# Patient Record
Sex: FEMALE | NOT HISPANIC OR LATINO | Employment: OTHER | URBAN - METROPOLITAN AREA
[De-identification: names, ages, dates, MRNs, and addresses within clinical notes are randomized per-mention and may not be internally consistent; named-entity substitution may affect disease eponyms.]

---

## 2017-12-21 ENCOUNTER — HOSPITAL ENCOUNTER (EMERGENCY)
Facility: CLINIC | Age: 37
Discharge: HOME | End: 2017-12-22
Attending: EMERGENCY MEDICINE | Admitting: EMERGENCY MEDICINE
Payer: COMMERCIAL

## 2017-12-21 ENCOUNTER — APPOINTMENT (OUTPATIENT)
Dept: CT IMAGING | Facility: CLINIC | Age: 37
End: 2017-12-21
Attending: EMERGENCY MEDICINE
Payer: COMMERCIAL

## 2017-12-21 DIAGNOSIS — R11.2 NAUSEA AND VOMITING, INTRACTABILITY OF VOMITING NOT SPECIFIED, UNSPECIFIED VOMITING TYPE: ICD-10-CM

## 2017-12-21 LAB
ALBUMIN SERPL-MCNC: 4.5 G/DL (ref 3.4–5)
ALP SERPL-CCNC: 77 U/L (ref 40–150)
ALT SERPL W P-5'-P-CCNC: 22 U/L (ref 0–50)
ANION GAP SERPL CALCULATED.3IONS-SCNC: 16 MMOL/L (ref 3–14)
AST SERPL W P-5'-P-CCNC: 19 U/L (ref 0–45)
BASOPHILS # BLD AUTO: 0 10E9/L (ref 0–0.2)
BASOPHILS NFR BLD AUTO: 0.2 %
BILIRUB SERPL-MCNC: 0.7 MG/DL (ref 0.2–1.3)
BUN SERPL-MCNC: 9 MG/DL (ref 7–30)
CALCIUM SERPL-MCNC: 9.8 MG/DL (ref 8.5–10.1)
CHLORIDE SERPL-SCNC: 105 MMOL/L (ref 94–109)
CO2 SERPL-SCNC: 17 MMOL/L (ref 20–32)
CREAT SERPL-MCNC: 0.59 MG/DL (ref 0.52–1.04)
DIFFERENTIAL METHOD BLD: NORMAL
EOSINOPHIL # BLD AUTO: 0.5 10E9/L (ref 0–0.7)
EOSINOPHIL NFR BLD AUTO: 5.3 %
ERYTHROCYTE [DISTWIDTH] IN BLOOD BY AUTOMATED COUNT: 13.2 % (ref 10–15)
GFR SERPL CREATININE-BSD FRML MDRD: >90 ML/MIN/1.7M2
GLUCOSE SERPL-MCNC: 117 MG/DL (ref 70–99)
HCT VFR BLD AUTO: 41.3 % (ref 35–47)
HGB BLD-MCNC: 14.5 G/DL (ref 11.7–15.7)
IMM GRANULOCYTES # BLD: 0 10E9/L (ref 0–0.4)
IMM GRANULOCYTES NFR BLD: 0.2 %
LYMPHOCYTES # BLD AUTO: 2.8 10E9/L (ref 0.8–5.3)
LYMPHOCYTES NFR BLD AUTO: 27.8 %
MCH RBC QN AUTO: 31.1 PG (ref 26.5–33)
MCHC RBC AUTO-ENTMCNC: 35.1 G/DL (ref 31.5–36.5)
MCV RBC AUTO: 89 FL (ref 78–100)
MONOCYTES # BLD AUTO: 0.4 10E9/L (ref 0–1.3)
MONOCYTES NFR BLD AUTO: 4 %
NEUTROPHILS # BLD AUTO: 6.3 10E9/L (ref 1.6–8.3)
NEUTROPHILS NFR BLD AUTO: 62.5 %
NRBC # BLD AUTO: 0 10*3/UL
NRBC BLD AUTO-RTO: 0 /100
PLATELET # BLD AUTO: 313 10E9/L (ref 150–450)
POTASSIUM SERPL-SCNC: 3.7 MMOL/L (ref 3.4–5.3)
PROT SERPL-MCNC: 8.3 G/DL (ref 6.8–8.8)
RBC # BLD AUTO: 4.66 10E12/L (ref 3.8–5.2)
SODIUM SERPL-SCNC: 138 MMOL/L (ref 133–144)
WBC # BLD AUTO: 10.1 10E9/L (ref 4–11)

## 2017-12-21 PROCEDURE — 25000128 H RX IP 250 OP 636: Performed by: EMERGENCY MEDICINE

## 2017-12-21 PROCEDURE — 85025 COMPLETE CBC W/AUTO DIFF WBC: CPT | Performed by: EMERGENCY MEDICINE

## 2017-12-21 PROCEDURE — 80053 COMPREHEN METABOLIC PANEL: CPT | Performed by: EMERGENCY MEDICINE

## 2017-12-21 PROCEDURE — 25000125 ZZHC RX 250: Performed by: EMERGENCY MEDICINE

## 2017-12-21 PROCEDURE — 96374 THER/PROPH/DIAG INJ IV PUSH: CPT

## 2017-12-21 PROCEDURE — 96375 TX/PRO/DX INJ NEW DRUG ADDON: CPT

## 2017-12-21 PROCEDURE — 99285 EMERGENCY DEPT VISIT HI MDM: CPT | Mod: 25

## 2017-12-21 PROCEDURE — 74177 CT ABD & PELVIS W/CONTRAST: CPT

## 2017-12-21 RX ORDER — IOPAMIDOL 755 MG/ML
76 INJECTION, SOLUTION INTRAVASCULAR ONCE
Status: COMPLETED | OUTPATIENT
Start: 2017-12-21 | End: 2017-12-21

## 2017-12-21 RX ORDER — LORAZEPAM 2 MG/ML
1 INJECTION INTRAMUSCULAR ONCE
Status: COMPLETED | OUTPATIENT
Start: 2017-12-21 | End: 2017-12-21

## 2017-12-21 RX ORDER — PROCHLORPERAZINE MALEATE 10 MG
10 TABLET ORAL EVERY 8 HOURS PRN
Qty: 20 TABLET | Refills: 0 | Status: SHIPPED | OUTPATIENT
Start: 2017-12-21 | End: 2023-03-14

## 2017-12-21 RX ORDER — MORPHINE SULFATE 4 MG/ML
4 INJECTION, SOLUTION INTRAMUSCULAR; INTRAVENOUS ONCE
Status: COMPLETED | OUTPATIENT
Start: 2017-12-21 | End: 2017-12-21

## 2017-12-21 RX ORDER — ONDANSETRON 4 MG/1
4 TABLET, ORALLY DISINTEGRATING ORAL EVERY 6 HOURS PRN
Qty: 20 TABLET | Refills: 0 | Status: SHIPPED | OUTPATIENT
Start: 2017-12-21 | End: 2023-03-30

## 2017-12-21 RX ORDER — PANTOPRAZOLE SODIUM 40 MG/1
40 TABLET, DELAYED RELEASE ORAL DAILY
Qty: 30 TABLET | Refills: 0 | Status: SHIPPED | OUTPATIENT
Start: 2017-12-21 | End: 2018-01-20

## 2017-12-21 RX ORDER — ONDANSETRON 2 MG/ML
4 INJECTION INTRAMUSCULAR; INTRAVENOUS ONCE
Status: COMPLETED | OUTPATIENT
Start: 2017-12-21 | End: 2017-12-21

## 2017-12-21 RX ADMIN — SODIUM CHLORIDE 63 ML: 9 INJECTION, SOLUTION INTRAVENOUS at 22:33

## 2017-12-21 RX ADMIN — LORAZEPAM 1 MG: 2 INJECTION INTRAMUSCULAR; INTRAVENOUS at 20:53

## 2017-12-21 RX ADMIN — MORPHINE SULFATE 4 MG: 4 INJECTION, SOLUTION INTRAMUSCULAR; INTRAVENOUS at 21:25

## 2017-12-21 RX ADMIN — IOPAMIDOL 76 ML: 755 INJECTION, SOLUTION INTRAVENOUS at 22:33

## 2017-12-21 RX ADMIN — PROCHLORPERAZINE EDISYLATE 10 MG: 5 INJECTION INTRAMUSCULAR; INTRAVENOUS at 23:12

## 2017-12-21 RX ADMIN — ONDANSETRON 4 MG: 2 INJECTION INTRAMUSCULAR; INTRAVENOUS at 20:53

## 2017-12-21 ASSESSMENT — ENCOUNTER SYMPTOMS
LIGHT-HEADEDNESS: 1
VOMITING: 1
ABDOMINAL PAIN: 1
DIARRHEA: 0

## 2017-12-21 NOTE — ED AVS SNAPSHOT
Emergency Department    [address] [place] [address]    Phone:  [phone]    Fax:  [phone]                                       Kayley Scruggs   MRN: [id number]    Department:   Emergency Department   Date of Visit:  12/21/2017           After Visit Summary Signature Page     I have received my discharge instructions, and my questions have been answered. I have discussed any challenges I see with this plan with the nurse or doctor.    ..........................................................................................................................................  Patient/Patient Representative Signature      ..........................................................................................................................................  Patient Representative Print Name and Relationship to Patient    ..................................................               ................................................  Date                                            Time    ..........................................................................................................................................  Reviewed by Signature/Title    ...................................................              ..............................................  Date                                                            Time

## 2017-12-21 NOTE — ED AVS SNAPSHOT
Emergency Department    [address] [facility] [address]    Phone:  [phone]    Fax:  [phone]                                       Kayley Scruggs   MRN: [id number]    Department:   Emergency Department   Date of Visit:  12/21/2017           Patient Information     Date Of Birth          1980        Your diagnoses for this visit were:     Nausea and vomiting, intractability of vomiting not specified, unspecified vomiting type        You were seen by Aaron Sands MD.      Follow-up Information     Follow up with Clinic, [facility].    Why:  As needed, If symptoms worsen    Contact information:    [address], [address]  [facility] [address] [phone]          Follow up with  Emergency Department.    Specialty:  EMERGENCY MEDICINE    Why:  As needed, If symptoms worsen    Contact information:    [address] [facility] [id number] [phone]        Discharge Instructions       Discharge Instructions  Vomiting    You have been seen today for vomiting (throwing up). This is usually caused by a virus, but some bacteria, parasites, medicines or other medical conditions can cause similar symptoms. At this time your provider does not find that your vomiting is a sign of anything dangerous or life-threatening. However, sometimes the signs of serious illness do not show up right away. If you have new or worse symptoms, you may need to be seen again in the Emergency Department or by your primary provider. Remember that serious problems like appendicitis can start as vomiting.    Generally, every Emergency Department visit should have a follow-up clinic visit with either a primary or a specialty clinic/provider. Please follow-up as instructed by your emergency provider today.    Return to the Emergency Department if:    You keep vomiting and you are not able to keep liquids down.     You feel you are getting dehydrated, such as  being very thirsty, not urinating (peeing) at least every 8-12 hours, or feeling faint or lightheaded.     You develop a new fever, or your fever continues for more than 2 days.     You have abdominal (belly pain) that seems worse than cramps, is in one spot, or is getting worse over time. Appendicitis usually causes pain in the right lower abdomen (to the right and below your belly button) so watch for pain in this location.    You have blood in your vomit or stools.     You feel very weak.    You are not starting to improve within 24 hours of your visit here.     What can I do to help myself?    The most important thing to do is to drink clear liquids. If you have been vomiting a lot, it is best to have only small, frequent sips of liquids. Drinking too much at once may cause more vomiting. If you are vomiting often, you must replace minerals, sodium and potassium lost with your illness. Pedialyte  is the best available rehydration liquid but some find that it doesn t taste good so sports drinks are an alterative. You can also drink clear liquids such as water, weak tea, apple juice, and 7-Up . Avoid acid liquids (orange), caffeine (coffee) or alcohol. Do not drink milk until you no longer have diarrhea (loose stools).     After liquids are staying down, you may start eating mild foods. Soda crackers, toast, plain noodles, gelatin, applesauce and bananas are good first choices. Avoid foods that have acid, are spicy, fatty or have a lot of fiber (such as meats, coarse grains, vegetables). You may start eating these foods again in about 3 days when you are better.     Sometimes treatment includes prescription medicine to prevent nausea (sick to your stomach) and vomiting. If your provider prescribes these for you, take them as directed.     Do not take ibuprofen, naproxen, or other nonsteroidal anti-inflammatory (NSAID) medicines without checking with your healthcare provider.     If you were given a prescription  for medicine here today, be sure to read all of the information (including the package insert) that comes with your prescription.  This will include important information about the medicine, its side effects, and any warnings that you need to know about.  The pharmacist who fills the prescription can provide more information and answer questions you may have about the medicine.  If you have questions or concerns that the pharmacist cannot address, please call or return to the Emergency Department.     Remember that you can always come back to the Emergency Department if you are not able to see your regular provider in the amount of time listed above, if you get any new symptoms, or if there is anything that worries you.      Discharge Instructions  Abdominal Pain    Abdominal pain (belly pain) can be caused by many things. Your evaluation today does not show the exact cause for your pain. Your provider today has decided that it is unlikely your pain is due to a life threatening problem, or a problem requiring surgery or hospital admission. Sometimes those problems cannot be found right away, so it is very important that you follow up as directed.  Sometimes only the changes which occur over time allow the cause of your pain to be found.    Generally, every Emergency Department visit should have a follow-up clinic visit with either a primary or a specialty clinic/provider. Please follow-up as instructed by your emergency provider today. With abdominal pain, we often recommend very close follow-up, such as the following day.    ADULTS:  Return to the Emergency Department right away if:      You get an oral temperature above 102oF or as directed by your provider.    You have blood in your stools. This may be bright red or appear as black, tarry stools.      You keep vomiting (throwing up) or cannot drink liquids.    You see blood when you vomit.     You cannot have a bowel movement or you cannot pass gas.    Your  stomach gets bloated or bigger.    Your skin or the whites of your eyes look yellow.    You faint.    You have bloody, frequent or painful urination (peeing).    You have new symptoms or anything that worries you.    CHILDREN:  Return to the Emergency Department right away if your child has any of the above-listed symptoms or the following:      Pushes your hand away or screams/cries when his/her belly is touched.    You notice your child is very fussy or weak.    Your child is very tired and is too tired to eat or drink.    Your child is dehydrated.  Signs of dehydration can be:  o Significant change in the amount of wet diapers/urine.  o Your infant or child starts to have dry mouth and lips, or no saliva (spit) or tears.    PREGNANT WOMEN:  Return to the Emergency Department right away if you have any of the above-listed symptoms or the following:      You have bleeding, leaking fluid or passing tissue from the vagina.    You have worse pain or cramping, or pain in your shoulder or back.    You have vomiting that will not stop.    You have a temperature of 100oF or more.    Your baby is not moving as much as usual.    You faint.    You get a bad headache with or without eye problems and abdominal pain.    You have a seizure.    You have unusual discharge from your vagina and abdominal pain.    Abdominal pain is pretty common during pregnancy.  Your pain may or may not be related to your pregnancy. You should follow-up closely with your OB provider so they can evaluate you and your baby.  Until you follow-up with your regular provider, do the following:       Avoid sex and do not put anything in your vagina.    Drink clear fluids.    Only take medications approved by your provider.    MORE INFORMATION:    Appendicitis:  A possible cause of abdominal pain in any person who still has their appendix is acute appendicitis. Appendicitis is often hard to diagnose.  Testing does not always rule out early appendicitis or  "other causes of abdominal pain. Close follow-up with your provider and re-evaluations may be needed to figure out the reason for your abdominal pain.    Follow-up:  It is very important that you make an appointment with your clinic and go to the appointment.  If you do not follow-up with your primary provider, it may result in missing an important development which could result in permanent injury or disability and/or lasting pain.  If there is any problem keeping your appointment, call your provider or return to the Emergency Department.    Medications:  Take your medications as directed by your provider today.  Before using over-the-counter medications, ask your provider and make sure to take the medications as directed.  If you have any questions about medications, ask your provider.    Diet:  Resume your normal diet as much as possible, but do not eat fried, fatty or spicy foods while you have pain.  Do not drink alcohol or have caffeine.  Do not smoke tobacco.    Probiotics: If you have been given an antibiotic, you may want to also take a probiotic pill or eat yogurt with live cultures. Probiotics have \"good bacteria\" to help your intestines stay healthy. Studies have shown that probiotics help prevent diarrhea (loose stools) and other intestine problems (including C. diff infection) when you take antibiotics. You can buy these without a prescription in the pharmacy section of the store.     If you were given a prescription for medicine here today, be sure to read all of the information (including the package insert) that comes with your prescription.  This will include important information about the medicine, its side effects, and any warnings that you need to know about.  The pharmacist who fills the prescription can provide more information and answer questions you may have about the medicine.  If you have questions or concerns that the pharmacist cannot address, please call or return to the Emergency " Department.       Remember that you can always come back to the Emergency Department if you are not able to see your regular provider in the amount of time listed above, if you get any new symptoms, or if there is anything that worries you.      24 Hour Appointment Hotline       To make an appointment at any [facility], call [phone] ([phone]). If you don't have a family doctor or clinic, we will help you find one. [facility] clinics are conveniently located to serve the needs of you and your family.             Review of your medicines      START taking        Dose / Directions Last dose taken    ondansetron 4 MG ODT tab   Commonly known as:  ZOFRAN ODT   Dose:  4 mg   Quantity:  20 tablet        Take 1 tablet (4 mg) by mouth every 6 hours as needed for nausea or vomiting   Refills:  0        pantoprazole 40 MG EC tablet   Commonly known as:  PROTONIX   Dose:  40 mg   Quantity:  30 tablet        Take 1 tablet (40 mg) by mouth daily for 30 doses   Refills:  0        prochlorperazine 10 MG tablet   Commonly known as:  COMPAZINE   Dose:  10 mg   Quantity:  20 tablet        Take 1 tablet (10 mg) by mouth every 8 hours as needed for nausea   Refills:  0        ranitidine 150 MG tablet   Commonly known as:  ZANTAC   Dose:  150 mg   Quantity:  20 tablet        Take 1 tablet (150 mg) by mouth 2 times daily for 10 days   Refills:  0          Our records show that you are taking the medicines listed below. If these are incorrect, please call your family doctor or clinic.        Dose / Directions Last dose taken    loratadine 10 MG tablet   Commonly known as:  CLARITIN   Dose:  10 mg        Take 10 mg by mouth daily   Refills:  0        MICROGESTIN 1.5-30 MG-MCG per tablet   Dose:  1 tablet   Generic drug:  norethindrone-ethinyl estradiol        Take 1 tablet by mouth daily Unsure dose   Refills:  0        MIDRIN PO        Refills:  0                Prescriptions were sent or printed at these locations (4  Prescriptions)                   Other Prescriptions                Printed at Department/Unit printer (4 of 4)         prochlorperazine (COMPAZINE) 10 MG tablet               ondansetron (ZOFRAN ODT) 4 MG ODT tab               pantoprazole (PROTONIX) 40 MG EC tablet               ranitidine (ZANTAC) 150 MG tablet                Procedures and tests performed during your visit     CBC with platelets differential    CT Abdomen Pelvis w Contrast    Comprehensive metabolic panel      Orders Needing Specimen Collection     None      Pending Results     No orders found for last 3 day(s).            Pending Culture Results     No orders found for last 3 day(s).            Pending Results Instructions     If you had any lab results that were not finalized at the time of your Discharge, you can call the ED Lab Result RN at [phone]. You will be contacted by this team for any positive Lab results or changes in treatment. The nurses are available 7 days a week from 10A to 6:30P.  You can leave a message 24 hours per day and they will return your call.        Test Results From Your Hospital Stay        12/21/2017  8:55 PM      Component Results     Component Value Ref Range & Units Status    WBC 10.1 4.0 - 11.0 10e9/L Final    RBC Count 4.66 3.8 - 5.2 10e12/L Final    Hemoglobin 14.5 11.7 - 15.7 g/dL Final    Hematocrit 41.3 35.0 - 47.0 % Final    MCV 89 78 - 100 fl Final    MCH 31.1 26.5 - 33.0 pg Final    MCHC 35.1 31.5 - 36.5 g/dL Final    RDW 13.2 10.0 - 15.0 % Final    Platelet Count 313 150 - 450 10e9/L Final    Diff Method Automated Method  Final    % Neutrophils 62.5 % Final    % Lymphocytes 27.8 % Final    % Monocytes 4.0 % Final    % Eosinophils 5.3 % Final    % Basophils 0.2 % Final    % Immature Granulocytes 0.2 % Final    Nucleated RBCs 0 0 /100 Final    Absolute Neutrophil 6.3 1.6 - 8.3 10e9/L Final    Absolute Lymphocytes 2.8 0.8 - 5.3 10e9/L Final    Absolute Monocytes 0.4 0.0 - 1.3 10e9/L Final    Absolute  Eosinophils 0.5 0.0 - 0.7 10e9/L Final    Absolute Basophils 0.0 0.0 - 0.2 10e9/L Final    Abs Immature Granulocytes 0.0 0 - 0.4 10e9/L Final    Absolute Nucleated RBC 0.0  Final         12/21/2017  9:12 PM      Component Results     Component Value Ref Range & Units Status    Sodium 138 133 - 144 mmol/L Final    Potassium 3.7 3.4 - 5.3 mmol/L Final    Chloride 105 94 - 109 mmol/L Final    Carbon Dioxide 17 (L) 20 - 32 mmol/L Final    Anion Gap 16 (H) 3 - 14 mmol/L Final    Glucose 117 (H) 70 - 99 mg/dL Final    Urea Nitrogen 9 7 - 30 mg/dL Final    Creatinine 0.59 0.52 - 1.04 mg/dL Final    GFR Estimate >90 >60 mL/min/1.7m2 Final    Non  GFR Calc    GFR Estimate If Black >90 >60 mL/min/1.7m2 Final    African American GFR Calc    Calcium 9.8 8.5 - 10.1 mg/dL Final    Bilirubin Total 0.7 0.2 - 1.3 mg/dL Final    Albumin 4.5 3.4 - 5.0 g/dL Final    Protein Total 8.3 6.8 - 8.8 g/dL Final    Alkaline Phosphatase 77 40 - 150 U/L Final    ALT 22 0 - 50 U/L Final    AST 19 0 - 45 U/L Final         12/21/2017 11:09 PM      Narrative     CT ABDOMEN AND PELVIS WITH CONTRAST  12/21/2017  10:40 PM     HISTORY: Abdominal pain, vomiting. History of Nissen. History of Hyst.  Obstruction.     CONTRAST DOSE: Radiation dose for this scan was reduced using  automated exposure control, adjustment of the mA and/or kV according  to patient size, or iterative reconstruction technique.    FINDINGS:  Sutures are noted in the cardia of the stomach. The liver,  spleen, kidneys, adrenal glands, pancreas, and gallbladder appear  within normal limits. The appendix is not definitively identified. No  pericecal inflammatory changes are noted. Fluid is noted within  scattered loops of small and large bowel. However, there is no  evidence of bowel obstruction or pericolonic inflammatory stranding.  There is no free peritoneal fluid or air. Pelvic contents appear  within normal limits.        Impression     IMPRESSION: No CT evidence  of an acute inflammatory process in the  abdomen or pelvis. No definitive evidence of bowel obstruction. No  free peritoneal fluid or air.    KOBE BURCH MD                Clinical Quality Measure: Blood Pressure Screening     Your blood pressure was checked while you were in the emergency department today. The last reading we obtained was  BP: 121/70 . Please read the guidelines below about what these numbers mean and what you should do about them.  If your systolic blood pressure (the top number) is less than 120 and your diastolic blood pressure (the bottom number) is less than 80, then your blood pressure is normal. There is nothing more that you need to do about it.  If your systolic blood pressure (the top number) is 120-139 or your diastolic blood pressure (the bottom number) is 80-89, your blood pressure may be higher than it should be. You should have your blood pressure rechecked within a year by a primary care provider.  If your systolic blood pressure (the top number) is 140 or greater or your diastolic blood pressure (the bottom number) is 90 or greater, you may have high blood pressure. High blood pressure is treatable, but if left untreated over time it can put you at risk for heart attack, stroke, or kidney failure. You should have your blood pressure rechecked by a primary care provider within the next 4 weeks.  If your provider in the emergency department today gave you specific instructions to follow-up with your doctor or provider even sooner than that, you should follow that instruction and not wait for up to 4 weeks for your follow-up visit.        Thank you for choosing [facility]       Thank you for choosing [facility] for your care. Our goal is always to provide you with excellent care. Hearing back from our patients is one way we can continue to improve our services. Please take a few minutes to complete the written survey that you may receive in the mail after you visit with us. Thank  "you!        [facility] [employer] Information     [employer] lets you send messages to your doctor, view your test results, renew your prescriptions, schedule appointments and more. To sign up, go to [email or web address] . Click on \"Log in\" on the left side of the screen, which will take you to the Welcome page. Then click on \"Sign up Now\" on the right side of the page.     You will be asked to enter the access code listed below, as well as some personal information. Please follow the directions to create your username and password.     Your access code is: [email or web address]  Expires: 3/22/2018 12:14 AM     Your access code will  in 90 days. If you need help or a new code, please call your [facility] clinic or [phone].        Care EveryWhere ID     This is your Care EveryWhere ID. This could be used by other organizations to access your [facility] medical records  [id number]        Equal Access to Services     LANDRY HERNANDES : Hadkeri dozier Sojaelyn, waaxda ludaily, qaybta kaalmabaylee ron, janene nuno . So [facility] [phone].    ATENCIÓN: Si habla lauraañol, tiene a harrison disposición servicios gratuitos de asistencia lingüística. Llame al [phone].    We comply with applicable federal civil rights laws and [place] laws. We do not discriminate on the basis of race, color, national origin, age, disability, sex, sexual orientation, or gender identity.            After Visit Summary       This is your record. Keep this with you and show to your community pharmacist(s) and doctor(s) at your next visit.                  "

## 2017-12-22 VITALS
SYSTOLIC BLOOD PRESSURE: 113 MMHG | TEMPERATURE: 98.2 F | RESPIRATION RATE: 16 BRPM | DIASTOLIC BLOOD PRESSURE: 76 MMHG | OXYGEN SATURATION: 95 % | WEIGHT: 153 LBS | HEIGHT: 60 IN | BODY MASS INDEX: 30.04 KG/M2

## 2017-12-22 NOTE — ED PROVIDER NOTES
History     Chief Complaint:  Abdominal Pain     HPI   Kayley Scruggs is a 37 year old female who presents to the emergency department s/p laparscopic Nissen fundoscopy and hysterectomy today for evaluation of acute onset abdominal pain and vomiting. Today (1400), the patient had sudden onset of lightheadedness followed by hematemesis and sharp periumbilical abdominal pain. She was seen at [facility] earlier today where she was given Maalox. She was discharged but pain returned 15 minutes after returning home prompting her to call EMS. Here, patient notes she has periodic episodes like this over the years of vomiting that have responded well to Prilosec and Zantac. In addition, she also complains of tingling in her face and cramping in her upper and lower extremities. No other concerns voiced.     Allergies:  Imitrex [Sumatriptan]     Medications:    Microgestin     Past Medical History:    History reviewed. No pertinent past medical history.    Past Surgical History:    Hysterectomy  Laparoscopic Nissen fundoscopy     Family History:    History reviewed. No pertinent family history.     Social History:  The patient was accompanied to the ED by EMS.  Smoking Status: Former smoker  Smokeless Tobacco: Never used  Alcohol Use: No  Marital Status:  Single [1]     Review of Systems   Gastrointestinal: Positive for abdominal pain and vomiting. Negative for diarrhea.   Musculoskeletal:        Cramping in legs and hands    Neurological: Positive for light-headedness.        Tingling in face   All other systems reviewed and are negative.    Physical Exam   Vitals:  BP: 133/87  Heart Rate: 80  Temp: 98.2  F (36.8  C)  Resp: 16  Height: 152.4 cm (5')  Weight: 69.4 kg (153 lb)  SpO2: 99 %    Physical Exam  General: Resting uncomfortably on the gurney, hyperventilating, subjective hand and leg cramping, tingling to her face  Head:  The scalp, face, and head appear normal  Eyes:  The pupils are equal, round, and  reactive to light    There is no nystagmus    Extraocular muscles are intact    Conjunctivae and sclerae are normal  ENT:    The nose is normal    Pinnae are normal    The oropharynx is normal    Uvula is in the midline  Neck:  Normal range of motion    There is no rigidity noted    There is no midline cervical spine pain/tenderness    Trachea is in the midline    No mass is detected  CV:  Regular rate and underlying rhythm     Normal S1/S2, no S3/S4    No pathological murmur detected  Resp:  Lungs are clear    There is no tachypnea    Non-labored    No rales    No wheezing   GI:  Abdomen is soft, there is no rigidity    No distension    No tympani    There is mid periumbilical tenderness, no focal abdominal pain     Non-surgical without peritoneal features  MS:  Normal muscular tone    Symmetric motor strength    No major joint effusions    No asymmetric leg swelling, no calf tenderness  Skin:  No rash or acute skin lesions noted  Neuro: Speech is normal and fluent  Psych:  Awake. Alert.      Normal affect.  Appropriate interactions.  Lymph: No anterior cervical lymphadenopathy noted    Emergency Department Course   Imaging:  Radiology findings were communicated with the patient who voiced understanding of the findings.    CT Abdomen Pelvis w Contrast  IMPRESSION: No CT evidence of an acute inflammatory process in the  abdomen or pelvis. No definitive evidence of bowel obstruction. No  free peritoneal fluid or air. As read by Radiology.    Laboratory:  Laboratory findings were communicated with the patient who voiced understanding of the findings.    CBC: AWNL. (WBC 10.1, HGB 14.5, )   CMP: Glucose: 117(H) w/o WNL (Creatinine: 0.59)    Interventions:  2053- Zofran 4 mg IV   2053- Ativan 1 mg IV   2125- Morphine 4 mg IV   2300- Compazine 10 mg IV     Emergency Department Course:  Nursing notes and vitals reviewed.  I performed an exam of the patient as documented above.     IV was inserted and blood was drawn  for laboratory testing, results above.    The patient was sent for an abdomen pelvis CT while in the emergency department, findings above.    I discussed the treatment plan with the patient. They expressed understanding of this plan and consented to discharge.     Impression & Plan      Medical Decision Making:  This patient presents with a history of Nissen fundoplication, hysterectomy, and a history of intermittent nausea vomiting and abdominal cramps. Her vital signs are normal. Clinical examination of the abdomen is normal.  Given the patient's concerned about the possibility of obstruction or post surgical complication/scarring CT scan of the abdomen was performed which is negative. There is no significant evidence of obstruction or gastric obstruction.  Viral etiology is possible. The patient did have a hyperventilation spell with acute anxiety. This likely intensified the situation. The patient will be sent home on antiemetics including Zofran and Compazine. The patient is signed out at 2313 hrs. To Dr. Whiting. It is anticipated that she will be able to go home soon.    Diagnosis:    ICD-10-CM    1. Nausea and vomiting, intractability of vomiting not specified, unspecified vomiting type R11.2        Disposition: Findings and plan explained to the Patient. Patient discharged home with instructions regarding supportive care, medications, and reasons to return. The importance of close follow-up was reviewed. The patient was prescribed Compazine, Zofran, Protonix, and ranitidine.    Discharge Medications:  New Prescriptions    No medications on file     Scribe Disclosure:  Mikael AMBRIZ, am serving as a scribe at 8:43 PM on 12/21/2017 to document services personally performed by Aaron Sands MD, based on my observations and the provider's statements to me.    12/21/2017    EMERGENCY DEPARTMENT       Aaron Sands MD  12/21/17 3901

## 2017-12-22 NOTE — ED NOTES
Bed: ED07  Expected date:   Expected time:   Means of arrival:   Comments:  Sushila 431 Abd pain 37 female

## 2017-12-22 NOTE — DISCHARGE INSTRUCTIONS
Discharge Instructions  Vomiting    You have been seen today for vomiting (throwing up). This is usually caused by a virus, but some bacteria, parasites, medicines or other medical conditions can cause similar symptoms. At this time your provider does not find that your vomiting is a sign of anything dangerous or life-threatening. However, sometimes the signs of serious illness do not show up right away. If you have new or worse symptoms, you may need to be seen again in the Emergency Department or by your primary provider. Remember that serious problems like appendicitis can start as vomiting.    Generally, every Emergency Department visit should have a follow-up clinic visit with either a primary or a specialty clinic/provider. Please follow-up as instructed by your emergency provider today.    Return to the Emergency Department if:    You keep vomiting and you are not able to keep liquids down.     You feel you are getting dehydrated, such as being very thirsty, not urinating (peeing) at least every 8-12 hours, or feeling faint or lightheaded.     You develop a new fever, or your fever continues for more than 2 days.     You have abdominal (belly pain) that seems worse than cramps, is in one spot, or is getting worse over time. Appendicitis usually causes pain in the right lower abdomen (to the right and below your belly button) so watch for pain in this location.    You have blood in your vomit or stools.     You feel very weak.    You are not starting to improve within 24 hours of your visit here.     What can I do to help myself?    The most important thing to do is to drink clear liquids. If you have been vomiting a lot, it is best to have only small, frequent sips of liquids. Drinking too much at once may cause more vomiting. If you are vomiting often, you must replace minerals, sodium and potassium lost with your illness. Pedialyte  is the best available rehydration liquid but some find that it doesn t  taste good so sports drinks are an alterative. You can also drink clear liquids such as water, weak tea, apple juice, and 7-Up . Avoid acid liquids (orange), caffeine (coffee) or alcohol. Do not drink milk until you no longer have diarrhea (loose stools).     After liquids are staying down, you may start eating mild foods. Soda crackers, toast, plain noodles, gelatin, applesauce and bananas are good first choices. Avoid foods that have acid, are spicy, fatty or have a lot of fiber (such as meats, coarse grains, vegetables). You may start eating these foods again in about 3 days when you are better.     Sometimes treatment includes prescription medicine to prevent nausea (sick to your stomach) and vomiting. If your provider prescribes these for you, take them as directed.     Do not take ibuprofen, naproxen, or other nonsteroidal anti-inflammatory (NSAID) medicines without checking with your healthcare provider.     If you were given a prescription for medicine here today, be sure to read all of the information (including the package insert) that comes with your prescription.  This will include important information about the medicine, its side effects, and any warnings that you need to know about.  The pharmacist who fills the prescription can provide more information and answer questions you may have about the medicine.  If you have questions or concerns that the pharmacist cannot address, please call or return to the Emergency Department.     Remember that you can always come back to the Emergency Department if you are not able to see your regular provider in the amount of time listed above, if you get any new symptoms, or if there is anything that worries you.      Discharge Instructions  Abdominal Pain    Abdominal pain (belly pain) can be caused by many things. Your evaluation today does not show the exact cause for your pain. Your provider today has decided that it is unlikely your pain is due to a life  threatening problem, or a problem requiring surgery or hospital admission. Sometimes those problems cannot be found right away, so it is very important that you follow up as directed.  Sometimes only the changes which occur over time allow the cause of your pain to be found.    Generally, every Emergency Department visit should have a follow-up clinic visit with either a primary or a specialty clinic/provider. Please follow-up as instructed by your emergency provider today. With abdominal pain, we often recommend very close follow-up, such as the following day.    ADULTS:  Return to the Emergency Department right away if:      You get an oral temperature above 102oF or as directed by your provider.    You have blood in your stools. This may be bright red or appear as black, tarry stools.      You keep vomiting (throwing up) or cannot drink liquids.    You see blood when you vomit.     You cannot have a bowel movement or you cannot pass gas.    Your stomach gets bloated or bigger.    Your skin or the whites of your eyes look yellow.    You faint.    You have bloody, frequent or painful urination (peeing).    You have new symptoms or anything that worries you.    CHILDREN:  Return to the Emergency Department right away if your child has any of the above-listed symptoms or the following:      Pushes your hand away or screams/cries when his/her belly is touched.    You notice your child is very fussy or weak.    Your child is very tired and is too tired to eat or drink.    Your child is dehydrated.  Signs of dehydration can be:  o Significant change in the amount of wet diapers/urine.  o Your infant or child starts to have dry mouth and lips, or no saliva (spit) or tears.    PREGNANT WOMEN:  Return to the Emergency Department right away if you have any of the above-listed symptoms or the following:      You have bleeding, leaking fluid or passing tissue from the vagina.    You have worse pain or cramping, or pain in  your shoulder or back.    You have vomiting that will not stop.    You have a temperature of 100oF or more.    Your baby is not moving as much as usual.    You faint.    You get a bad headache with or without eye problems and abdominal pain.    You have a seizure.    You have unusual discharge from your vagina and abdominal pain.    Abdominal pain is pretty common during pregnancy.  Your pain may or may not be related to your pregnancy. You should follow-up closely with your OB provider so they can evaluate you and your baby.  Until you follow-up with your regular provider, do the following:       Avoid sex and do not put anything in your vagina.    Drink clear fluids.    Only take medications approved by your provider.    MORE INFORMATION:    Appendicitis:  A possible cause of abdominal pain in any person who still has their appendix is acute appendicitis. Appendicitis is often hard to diagnose.  Testing does not always rule out early appendicitis or other causes of abdominal pain. Close follow-up with your provider and re-evaluations may be needed to figure out the reason for your abdominal pain.    Follow-up:  It is very important that you make an appointment with your clinic and go to the appointment.  If you do not follow-up with your primary provider, it may result in missing an important development which could result in permanent injury or disability and/or lasting pain.  If there is any problem keeping your appointment, call your provider or return to the Emergency Department.    Medications:  Take your medications as directed by your provider today.  Before using over-the-counter medications, ask your provider and make sure to take the medications as directed.  If you have any questions about medications, ask your provider.    Diet:  Resume your normal diet as much as possible, but do not eat fried, fatty or spicy foods while you have pain.  Do not drink alcohol or have caffeine.  Do not smoke  "tobacco.    Probiotics: If you have been given an antibiotic, you may want to also take a probiotic pill or eat yogurt with live cultures. Probiotics have \"good bacteria\" to help your intestines stay healthy. Studies have shown that probiotics help prevent diarrhea (loose stools) and other intestine problems (including C. diff infection) when you take antibiotics. You can buy these without a prescription in the pharmacy section of the store.     If you were given a prescription for medicine here today, be sure to read all of the information (including the package insert) that comes with your prescription.  This will include important information about the medicine, its side effects, and any warnings that you need to know about.  The pharmacist who fills the prescription can provide more information and answer questions you may have about the medicine.  If you have questions or concerns that the pharmacist cannot address, please call or return to the Emergency Department.       Remember that you can always come back to the Emergency Department if you are not able to see your regular provider in the amount of time listed above, if you get any new symptoms, or if there is anything that worries you.    "

## 2022-04-14 ENCOUNTER — HOSPITAL ENCOUNTER (OUTPATIENT)
Dept: GENERAL RADIOLOGY | Facility: CLINIC | Age: 42
Discharge: HOME | End: 2022-04-14
Attending: INTERNAL MEDICINE | Admitting: INTERNAL MEDICINE
Payer: COMMERCIAL

## 2022-04-14 DIAGNOSIS — Z98.890 HISTORY OF NISSEN FUNDOPLICATION: ICD-10-CM

## 2022-04-14 DIAGNOSIS — K21.9 CHRONIC GERD: ICD-10-CM

## 2022-04-14 DIAGNOSIS — R07.89 OTHER CHEST PAIN: ICD-10-CM

## 2022-04-14 DIAGNOSIS — R10.13 EPIGASTRIC PAIN: ICD-10-CM

## 2022-04-14 DIAGNOSIS — R14.2 BURPING: ICD-10-CM

## 2022-04-14 PROCEDURE — 74240 X-RAY XM UPR GI TRC 1CNTRST: CPT

## 2023-02-21 ENCOUNTER — HOSPITAL ENCOUNTER (OUTPATIENT)
Dept: NUCLEAR MEDICINE | Facility: CLINIC | Age: 43
Setting detail: NUCLEAR MEDICINE
Discharge: HOME | End: 2023-02-21
Attending: NURSE PRACTITIONER | Admitting: NURSE PRACTITIONER
Payer: COMMERCIAL

## 2023-02-21 DIAGNOSIS — R11.2 NAUSEA AND VOMITING, UNSPECIFIED VOMITING TYPE: ICD-10-CM

## 2023-02-21 DIAGNOSIS — R14.2 BELCHING: ICD-10-CM

## 2023-02-21 DIAGNOSIS — R10.9 ABDOMINAL PAIN, UNSPECIFIED ABDOMINAL LOCATION: ICD-10-CM

## 2023-02-21 PROCEDURE — A9541 TC99M SULFUR COLLOID: HCPCS | Performed by: NURSE PRACTITIONER

## 2023-02-21 PROCEDURE — 78264 GASTRIC EMPTYING IMG STUDY: CPT

## 2023-02-21 PROCEDURE — 343N000001 HC RX 343: Performed by: NURSE PRACTITIONER

## 2023-02-21 RX ADMIN — Medication 2 MILLICURIE: at 08:14

## 2023-02-28 ENCOUNTER — HOSPITAL ENCOUNTER (OUTPATIENT)
Dept: ULTRASOUND IMAGING | Facility: CLINIC | Age: 43
Discharge: HOME | End: 2023-02-28
Attending: NURSE PRACTITIONER | Admitting: NURSE PRACTITIONER
Payer: COMMERCIAL

## 2023-02-28 DIAGNOSIS — R11.2 NAUSEA AND VOMITING, UNSPECIFIED VOMITING TYPE: ICD-10-CM

## 2023-02-28 DIAGNOSIS — R10.9 ABDOMINAL PAIN, UNSPECIFIED ABDOMINAL LOCATION: ICD-10-CM

## 2023-02-28 DIAGNOSIS — R14.2 BELCHING: ICD-10-CM

## 2023-02-28 PROCEDURE — 76700 US EXAM ABDOM COMPLETE: CPT

## 2023-03-02 ENCOUNTER — TRANSFERRED RECORDS (OUTPATIENT)
Dept: SURGERY | Facility: CLINIC | Age: 43
End: 2023-03-02
Payer: COMMERCIAL

## 2023-03-09 ENCOUNTER — HOSPITAL ENCOUNTER (OUTPATIENT)
Dept: NUCLEAR MEDICINE | Facility: CLINIC | Age: 43
Setting detail: NUCLEAR MEDICINE
Discharge: HOME | End: 2023-03-09
Attending: NURSE PRACTITIONER | Admitting: NURSE PRACTITIONER
Payer: COMMERCIAL

## 2023-03-09 DIAGNOSIS — R14.0 ABDOMINAL DISTENSION (GASEOUS): ICD-10-CM

## 2023-03-09 PROCEDURE — 343N000001 HC RX 343: Performed by: NURSE PRACTITIONER

## 2023-03-09 PROCEDURE — 78227 HEPATOBIL SYST IMAGE W/DRUG: CPT

## 2023-03-09 PROCEDURE — A9537 TC99M MEBROFENIN: HCPCS | Performed by: NURSE PRACTITIONER

## 2023-03-09 RX ORDER — KIT FOR THE PREPARATION OF TECHNETIUM TC 99M MEBROFENIN 45 MG/10ML
6 INJECTION, POWDER, LYOPHILIZED, FOR SOLUTION INTRAVENOUS ONCE
Status: COMPLETED | OUTPATIENT
Start: 2023-03-09 | End: 2023-03-09

## 2023-03-09 RX ADMIN — MEBROFENIN 6 MILLICURIE: 45 INJECTION, POWDER, LYOPHILIZED, FOR SOLUTION INTRAVENOUS at 08:21

## 2023-03-10 ENCOUNTER — TRANSFERRED RECORDS (OUTPATIENT)
Dept: SURGERY | Facility: CLINIC | Age: 43
End: 2023-03-10
Payer: COMMERCIAL

## 2023-03-14 ENCOUNTER — OFFICE VISIT (OUTPATIENT)
Dept: SURGERY | Facility: CLINIC | Age: 43
End: 2023-03-14
Payer: COMMERCIAL

## 2023-03-14 ENCOUNTER — TELEPHONE (OUTPATIENT)
Dept: SURGERY | Facility: CLINIC | Age: 43
End: 2023-03-14

## 2023-03-14 VITALS
DIASTOLIC BLOOD PRESSURE: 74 MMHG | HEART RATE: 86 BPM | BODY MASS INDEX: 29.23 KG/M2 | OXYGEN SATURATION: 97 % | SYSTOLIC BLOOD PRESSURE: 106 MMHG | RESPIRATION RATE: 14 BRPM | HEIGHT: 59 IN | WEIGHT: 145 LBS

## 2023-03-14 DIAGNOSIS — K82.8 BILIARY DYSKINESIA: Primary | ICD-10-CM

## 2023-03-14 PROCEDURE — 99215 OFFICE O/P EST HI 40 MIN: CPT | Performed by: SURGERY

## 2023-03-14 RX ORDER — OMEPRAZOLE 40 MG/1
40 CAPSULE, DELAYED RELEASE ORAL EVERY MORNING
COMMUNITY
Start: 2023-01-31 | End: 2023-03-30

## 2023-03-14 RX ORDER — EPINEPHRINE 0.3 MG/.3ML
INJECTION SUBCUTANEOUS
COMMUNITY
Start: 2021-06-09

## 2023-03-14 RX ORDER — FEXOFENADINE HCL 180 MG/1
1 TABLET ORAL DAILY
COMMUNITY
Start: 2022-03-22

## 2023-03-14 RX ORDER — PROMETHAZINE HYDROCHLORIDE 12.5 MG/1
12.5 TABLET ORAL 2 TIMES DAILY PRN
COMMUNITY
Start: 2023-02-14

## 2023-03-14 RX ORDER — DULOXETINE 40 MG/1
40 CAPSULE, DELAYED RELEASE ORAL DAILY
COMMUNITY
Start: 2023-02-14

## 2023-03-14 RX ORDER — BUTALBITAL, ACETAMINOPHEN AND CAFFEINE 50; 325; 40 MG/1; MG/1; MG/1
1 TABLET ORAL 2 TIMES DAILY PRN
COMMUNITY
Start: 2021-12-27

## 2023-03-14 RX ORDER — INDOCYANINE GREEN AND WATER 25 MG
2.5 KIT INJECTION ONCE
Status: CANCELLED | OUTPATIENT
Start: 2023-03-14 | End: 2023-03-14

## 2023-03-14 RX ORDER — FAMOTIDINE 20 MG/1
20 TABLET, FILM COATED ORAL DAILY
COMMUNITY
Start: 2023-03-06 | End: 2023-03-22

## 2023-03-14 RX ORDER — LORAZEPAM 0.5 MG/1
0.5 TABLET ORAL
COMMUNITY
Start: 2021-12-27

## 2023-03-14 NOTE — LETTER
[facility]    [address], [address]  [place], [place] [address]  Phone [phone]  Fax [phone] [address], [address]  [facility] [place] [address]  Phone [phone]  Fax [phone]    [email or web address]   Showering Before Surgery      Your surgeon has asked you to take 2 showers before surgery.    Why is this important?  It is normal for bacteria (germs) to be on your skin. The skin protects us from these germs. When you have surgery, we cut the skin. Sometimes germs get into the cuts and cause infection (illness caused by germs). By following the instructions below and using special soap, you will lower the number of germs on your skin. This decreases your chance of infection.  Special soap  Buy or get 8 ounces of antiseptic surgical soap called 4% CHG. Common name brands of this soap are Hibiclens and Exidine.  You can find it at your local pharmacy, clinic or retail store. If you have trouble, ask your pharmacist to help you find the right substitute.  A note about shaving:  Do not shave within 12 inches of your incision (surgical cut) area for at least 3 days before surgery. Shaving can make small cuts in the skin. This puts you at a higher risk of infection.  Items you will need for each shower:   1 newly washed towel   4 ounces of one of the above soaps   Clean pajamas or clothes to change into    Follow these instructions:  Follow these steps the evening before surgery and the morning of surgery.  1. Wash your hair and body with your regular shampoo and soap. Make sure you rinse the shampoo and soap from your hair and body.  2. Using clean hands, apply about 2 ounces of soap gently on your skin from your ear lobes to your toes. Use on your groin area last. Do not use this soap on your face or head. If you get any soap in your eyes, ears or mouth, rinse right away.  3. Repeat step 2. It is very important to let the  soap stay on your skin for at least 1 minute.    4. Rinse well and dry off using a clean towel.    If you feel any tingling, itching or other irritation, rinse right away. It is normal to feel some coolness on the skin after using the antiseptic soap. Your skin may feel a bit dry after the shower, but do not use any lotions, creams or moisturizers. Do not use hair spray or other products in your hair.  5.  Dress in freshly washed clothes or pajamas. Use fresh pillowcases and sheets on your bed.    Repeat these steps the morning of surgery.  If you have any questions about showering or an allergy to CHG soap, please call your surgery center.

## 2023-03-14 NOTE — LETTER
[facility]    [address], [address]  [place], [place] [address]  Phone [phone]  Fax [phone]    [address], [address]  [place] [address]  Phone [phone]  Fax [phone]    [email or web address]   2023     Kayley Scruggs    RE: [id number]  : 1980    Kayley Scruggs has been scheduled for surgery on 3/30/2023 at 9:45 AM  at [facility] with Dr Norma Hunter.  The hospital is located at [address] in [place].      Please check in at the Surgery reception desk at 7:45 AM . This is located in the back of the hospital on the East side, just past the Emergency Room entrance.       DO NOT EAT OR DRINK ANYTHING 8 HOURS BEFORE YOUR ARRIVAL TIME.   You may have sips of clear liquids up until 2 hours before your arrival time. If you have been advised to take your medication, please do this early in the morning with just sips of clear liquid.       Hospital regulations require an updated pre-operative examination to be completed within 30 days of the procedure. This can be done by your primary care provider. Please ask them to fax documentation to [phone]. We also recommend you bring a copy with you.       You should shower before your surgery with Hibiclens or Exidine soap.  This can be found at your local pharmacy or you can pick it up from our office for free.  Please call our office if you have any questions.       You will be required to have an Adult (friend or family member) drive you home after your surgery and arrange for an adult to stay with you until the next morning.       You will receive several calls from our staff 3-7 days prior to your scheduled procedure with further details and to answer any questions you may have.      It is sometimes necessary to adjust the surgery schedule due to emergencies and additions to the schedule.  If your surgery is affected by  this, we greatly appreciate your flexibility and understanding in this matter      It is best if you call regarding post-operative questions between the hours of 8:00 am & 3:00 pm Monday-Friday, so you have access to the daytime care team that know you best.  ? Prescription refills are accepted during regular office hours only.      Please do not bring any Disability or FMLA papers to the hospital.  They need to be either faxed [phone]), mailed or hand delivered to our office by you or a family member for completion.  ? Please allow 14 business days to complete paperwork.    ? If you have questions or concerns, please contact our office at [phone].

## 2023-03-14 NOTE — LETTER
2023    RE: Kayley Scruggs, : 1980      [facility]  New Patient Office Visit        Kayley Scruggs is a 42 year old female seen in consultation for biliary dyskinesia at the request of Ernestine Nolasco NP        Assessment and Plan:  It is my impression that Kayley has biliary dyskinesia.  Extensive work-up was otherwise been normal and HIDA scan shows gallbladder ejection fraction of 23%  We discussed with all these findings the next step would be cholecystectomy.  I have offered her a robotic cholecystectomy.       We have discussed the indication, alternatives, risks and expected recovery.  Specifically we have discussed incisions, scarring, postoperative infections, anesthesia, bleeding, blood transfusion, open conversion, common bile duct injury, injury to intra-abdominal organs, adhesions that can lead to bowel obstruction, retained common bile duct stone, bile leak, DVT, PE, hernia, post cholecystectomy diarrhea, postoperative dietary restrictions and physical limitations.  We have discussed the recommended interventions and treatments for these complications.  All questions have been answered to the best of my ability.           We will schedule surgery at the patient's convenience.  Needs a preop H&P to be performed by PCP.      Chief complaint:  Abdominal pain, epigastric, nausea, vomiting, bloating     HPI:  Kayley Scruggs is a 42 year old female with a history of lap Nissen fundoplication in  who presents with 1 year of epigastric pain, bloating, gas, nausea and vomiting.  Symptoms have worsened over the last 1 month.  She did very well after her lap Nissen until 1 year ago.     She was seen over the last 1 year by [facility] and I have reviewed their documentation/notes     Per GI workup:  H. pylori stool antigen - 3/2022  EGD 3/2022 nissen appears intact.  Gastric biopsies negative  Upper GI 2022 normal, no evidence of slipped Nissen  Esophageal manometry  "9/2022 normal  Glucose breat test 11/2022 normal, no SIBO  Abdominal ultrasound 2/2023 normal, no gallstones  Gastric emptying 2/2023 read by radiology as abnormal, but patient states eventual outcome was read as normal by GI provider  SARABJIT TANNER 3/0222 EF 23% no cholecystitis, patient states she had significant symptoms during the scan  She had CMP, CBC, TSH, CRP, TB QuantiFERON gold, HIV Jan 2023 which were all normal     Tried promethazine instead of zofran  Was on omeprazole 40mg bid and famotidine bid, now down to once daily for each medication.  Has not noticed a difference.  No real specific heartburn symptoms  She states at this time she is following essentially no fat diet but still has symptoms after every meal.     No history of gallbladder disease     Review of Systems:  The 10 point review of systems is negative other than noted in the HPI and above.     Physical Exam:  Vitals: /74   Pulse 86   Resp 14   Ht 1.499 m (4' 11\")   Wt 65.8 kg (145 lb)   SpO2 97%   BMI 29.29 kg/m    BMI= Body mass index is 29.29 kg/m .  General - Well developed, well nourished female in no apparent distress  Abdomen: soft, flat, non-distended with no tenderness noted diffusely. no masses palpated.  Well-healed laparoscopic scars, skin darkening at the base of the umbilicus  Neurologic: alert, speech is clear, nonfocal  Psychiatric: Mood and affect appropriate     Relevant labs:  As in HPI     Imaging:     Abdominal ultrasound and HIDA scan reviewed personally by me     Ultrasound shows: negative cholelithiasis, negative gallbladder wall thickening, negative ductal dilatation, negative pericholecystic fluid, negative sonographic Heart's sign.         Recent Results (from the past 744 hour(s))   NM Gastric Emptying     [facility]     NUCLEAR MEDICINE GASTRIC EMPTYING February 21, 2023 12:39 PM     HISTORY: Nausea and vomiting, unspecified vomiting type. Belching.  Abdominal pain, unspecified abdominal " location.     TECHNIQUE: The patient ingested 2 mCi of Tc-99m sulfur colloid in egg  meal . Static images were acquired at approximately 1 hour intervals  out through 4 hours using a dual head gamma camera in an anterior and  posterior position. The calculation of emptying was based on dual head  imaging with geometric mean calculations. A Linear square fit was  calculated to the emptying curve to determine the amount of residual  activity at each time point.     FINDINGS:   Percent retention at 60 min = 78%. Normal ranges between 30-90%.  Percent retention at 120 min = 65%. Normal range is less than 45%.  Percent retention at 180 min = 15%. Normal range is less than 30%.  Percent retention at 240 min = 5%. Normal range is less than 10%.     T 1/2 emptying time =  135 mins. Normal ranges between 60 to 105  minutes.        Impression     IMPRESSION: Abnormal/delayed gastric emptying.     BOAZ RIBEIRO MD         SYSTEM ID:  [id number]   US Abdomen Complete     Narrative     US ABDOMEN COMPLETE 2/28/2023 8:41 AM     CLINICAL HISTORY: Nausea and vomiting. Abdominal pain.  TECHNIQUE: Complete abdominal ultrasound.  COMPARISON: CT of the abdomen and pelvis performed 12/21/2017.      FINDINGS:  GALLBLADDER: Unremarkable. No gallstones, wall thickening, or  pericholecystic fluid. Negative sonographic Heart's sign.     BILE DUCTS: No biliary dilatation. The common duct measures 2 mm.  Portions of the common duct could not be visualized due to overlying  bowel gas.     LIVER: Normal parenchyma with smooth contour. No focal hepatic lesions  are identified.     RIGHT KIDNEY: Normal size. Normal echogenicity with no hydronephrosis  or mass.      LEFT KIDNEY: Normal size. Normal echogenicity with no hydronephrosis  or mass.      SPLEEN: Normal.     PANCREAS: The visualized portions are normal.     AORTA: Normal caliber where seen.      IVC: Normal where visualized.     No ascites.        Impression      IMPRESSION:  Unremarkable complete abdominal ultrasound.     MARIO CUTLER MD         SYSTEM ID:  [id number]   NM Hepatobiliary Scan w GB EF     Narrative     NUCLEAR MEDICINE HEPATOBILIARY SCAN WITH GALLBLADDER EJECTION FRACTION   3/9/2023 10:45 AM      TECHNIQUE:  The patient was injected with 6 mCi Tc 99m mebrofenin IV.  The patient was given 8 ounces per oral Nepro at one hour. Dynamic  imaging over biliary system was performed. Region of interest placed  over the gallbladder to calculate ejection fraction.     HISTORY:  Symptoms unresponsive to PPI, workup thus far unremarkable.  Concern for biliary dyskinesis. Abdominal distension (gaseous).     COMPARISON:   Nuclear Study: None.     Other Relevant Studies: Ultrasound abdomen 2/28/2023.     FINDINGS:  Radiotracer uptake identified in the gallbladder, biliary  tree, and bowel. Gallbladder ejection fraction is 23%.        Impression     IMPRESSION:  1. No evidence for acute cholecystitis or biliary ductal obstruction.  2. Gallbladder ejection fraction is 23%.  3. The patient had abdominal cramping and vomiting during the exam.     VETO CEDENO MD         SYSTEM ID:  [id number]           Norma Hunter MD  [facility], [place]

## 2023-03-14 NOTE — PROGRESS NOTES
[facility]  New Patient Office Visit      Kayley Scruggs is a 42 year old female seen in consultation for biliary dyskinesia at the request of Ernestine Nolasco NP      Assessment and Plan:  It is my impression that Kayley has biliary dyskinesia.  Extensive work-up was otherwise been normal and HIDA scan shows gallbladder ejection fraction of 23%  We discussed with all these findings the next step would be cholecystectomy.  I have offered her a robotic cholecystectomy.      We have discussed the indication, alternatives, risks and expected recovery.  Specifically we have discussed incisions, scarring, postoperative infections, anesthesia, bleeding, blood transfusion, open conversion, common bile duct injury, injury to intra-abdominal organs, adhesions that can lead to bowel obstruction, retained common bile duct stone, bile leak, DVT, PE, hernia, post cholecystectomy diarrhea, postoperative dietary restrictions and physical limitations.  We have discussed the recommended interventions and treatments for these complications.  All questions have been answered to the best of my ability.           We will schedule surgery at the patient's convenience.  Needs a preop H&P to be performed by PCP.                    Chief complaint:  Abdominal pain, epigastric, nausea, vomiting, bloating    HPI:  Kayley Scruggs is a 42 year old female with a history of lap Nissen fundoplication in 2000 who presents with 1 year of epigastric pain, bloating, gas, nausea and vomiting.  Symptoms have worsened over the last 1 month.  She did very well after her lap Nissen until 1 year ago.    She was seen over the last 1 year by [facility] and I have reviewed their documentation/notes    Per GI workup:  H. pylori stool antigen - 3/2022  EGD 3/2022 nissen appears intact.  Gastric biopsies negative  Upper GI 4/2022 normal, no evidence of slipped Nissen  Esophageal manometry 9/2022 normal  Glucose breat test 11/2022 normal,  "no SIBO  Abdominal ultrasound 2/2023 normal, no gallstones  Gastric emptying 2/2023 read by radiology as abnormal, but patient states eventual outcome was read as normal by GI provider  SARABJIT TANNER 3/0222 EF 23% no cholecystitis, patient states she had significant symptoms during the scan  She had CMP, CBC, TSH, CRP, TB QuantiFERON gold, HIV Jan 2023 which were all normal    Tried promethazine instead of zofran  Was on omeprazole 40mg bid and famotidine bid, now down to once daily for each medication.  Has not noticed a difference.  No real specific heartburn symptoms  She states at this time she is following essentially no fat diet but still has symptoms after every meal.      Past Medical History:  No past medical history on file.    Past Surgical History:  Past Surgical History:   Procedure Laterality Date     HYSTERECTOMY       LAPAROSCOPIC NISSEN FUNDOPLICATION  2000     SPINE SURGERY  2018       Social History:  Social History     Tobacco Use     Smoking status: Former     Smokeless tobacco: Never   Substance Use Topics     Alcohol use: No        Family History:  No family history on file.  No history of gallbladder disease    Review of Systems:  The 10 point review of systems is negative other than noted in the HPI and above.    Physical Exam:  Vitals: /74   Pulse 86   Resp 14   Ht 1.499 m (4' 11\")   Wt 65.8 kg (145 lb)   SpO2 97%   BMI 29.29 kg/m    BMI= Body mass index is 29.29 kg/m .  General - Well developed, well nourished female in no apparent distress  Abdomen: soft, flat, non-distended with no tenderness noted diffusely. no masses palpated.  Well-healed laparoscopic scars, skin darkening at the base of the umbilicus  Neurologic: alert, speech is clear, nonfocal  Psychiatric: Mood and affect appropriate    Relevant labs:  As in HPI    Imaging:    Abdominal ultrasound and HIDA scan reviewed personally by me    Ultrasound shows: negative cholelithiasis, negative gallbladder wall thickening, " negative ductal dilatation, negative pericholecystic fluid, negative sonographic Heart's sign.    Recent Results (from the past 744 hour(s))   NM Gastric Emptying    Narrative    NUCLEAR MEDICINE GASTRIC EMPTYING February 21, 2023 12:39 PM    HISTORY: Nausea and vomiting, unspecified vomiting type. Belching.  Abdominal pain, unspecified abdominal location.    TECHNIQUE: The patient ingested 2 mCi of Tc-99m sulfur colloid in egg  meal . Static images were acquired at approximately 1 hour intervals  out through 4 hours using a dual head gamma camera in an anterior and  posterior position. The calculation of emptying was based on dual head  imaging with geometric mean calculations. A Linear square fit was  calculated to the emptying curve to determine the amount of residual  activity at each time point.    FINDINGS:   Percent retention at 60 min = 78%. Normal ranges between 30-90%.  Percent retention at 120 min = 65%. Normal range is less than 45%.  Percent retention at 180 min = 15%. Normal range is less than 30%.  Percent retention at 240 min = 5%. Normal range is less than 10%.    T 1/2 emptying time =  135 mins. Normal ranges between 60 to 105  minutes.      Impression    IMPRESSION: Abnormal/delayed gastric emptying.    BOAZ RIBEIRO MD         SYSTEM ID:  [id number]   US Abdomen Complete    Narrative    US ABDOMEN COMPLETE 2/28/2023 8:41 AM    CLINICAL HISTORY: Nausea and vomiting. Abdominal pain.  TECHNIQUE: Complete abdominal ultrasound.  COMPARISON: CT of the abdomen and pelvis performed 12/21/2017.     FINDINGS:  GALLBLADDER: Unremarkable. No gallstones, wall thickening, or  pericholecystic fluid. Negative sonographic Heart's sign.    BILE DUCTS: No biliary dilatation. The common duct measures 2 mm.  Portions of the common duct could not be visualized due to overlying  bowel gas.    LIVER: Normal parenchyma with smooth contour. No focal hepatic lesions  are identified.    RIGHT KIDNEY: Normal size.  Normal echogenicity with no hydronephrosis  or mass.     LEFT KIDNEY: Normal size. Normal echogenicity with no hydronephrosis  or mass.     SPLEEN: Normal.    PANCREAS: The visualized portions are normal.    AORTA: Normal caliber where seen.     IVC: Normal where visualized.    No ascites.      Impression    IMPRESSION:  Unremarkable complete abdominal ultrasound.    MARIO CUTLER MD         SYSTEM ID:  [id number]   NM Hepatobiliary Scan w GB EF    Narrative    NUCLEAR MEDICINE HEPATOBILIARY SCAN WITH GALLBLADDER EJECTION FRACTION   3/9/2023 10:45 AM     TECHNIQUE:  The patient was injected with 6 mCi Tc 99m mebrofenin IV.  The patient was given 8 ounces per oral Nepro at one hour. Dynamic  imaging over biliary system was performed. Region of interest placed  over the gallbladder to calculate ejection fraction.    HISTORY:  Symptoms unresponsive to PPI, workup thus far unremarkable.  Concern for biliary dyskinesis. Abdominal distension (gaseous).    COMPARISON:   Nuclear Study: None.    Other Relevant Studies: Ultrasound abdomen 2/28/2023.    FINDINGS:  Radiotracer uptake identified in the gallbladder, biliary  tree, and bowel. Gallbladder ejection fraction is 23%.      Impression    IMPRESSION:  1. No evidence for acute cholecystitis or biliary ductal obstruction.  2. Gallbladder ejection fraction is 23%.  3. The patient had abdominal cramping and vomiting during the exam.    VETO CEDENO MD         SYSTEM ID:  [id number]         This note was created using voice recognition software. Undetected word substitutions or other errors may have occurred.     45 minutes spent on the date of the encounter doing chart review, history and exam, documentation and further activities as noted above      Norma Hunter MD  [facility], [place]    Please route or send letter to:  Referring Provider

## 2023-03-14 NOTE — TELEPHONE ENCOUNTER
Type of surgery: ROBOTIC ASSISTED LAPROSCOPIC CHOLECYSTECTOMY    Location of surgery: [facility]  Date and time of surgery: 3/30/2023 @ 9:55 AM   Surgeon: Norma Hunter MD    Pre-Op Appt Date: PATIENT TO SCHEDULE    Post-Op Appt Date: PATIENT TO SCHEDULE     Packet sent out: Yes  Pre-cert/Authorization completed:  Not Applicable  Date: 3/14/2023         ROBOTIC ASSISTED LAPROSCOPIC CHOLECYSTECTOMY    GENERAL PATIENT  INST TO HAVE H&P WITH DR CHICAS 60 MIN REQ PA ASSIST JLS NMS (85 AVERAGE)

## 2023-03-22 RX ORDER — CYCLOBENZAPRINE HCL 10 MG
5-10 TABLET ORAL
COMMUNITY
Start: 2021-12-27

## 2023-03-30 ENCOUNTER — ANESTHESIA (OUTPATIENT)
Dept: SURGERY | Facility: CLINIC | Age: 43
End: 2023-03-30
Payer: COMMERCIAL

## 2023-03-30 ENCOUNTER — APPOINTMENT (OUTPATIENT)
Dept: CT IMAGING | Facility: CLINIC | Age: 43
End: 2023-03-30
Attending: EMERGENCY MEDICINE
Payer: COMMERCIAL

## 2023-03-30 ENCOUNTER — APPOINTMENT (OUTPATIENT)
Dept: SURGERY | Facility: PHYSICIAN GROUP | Age: 43
End: 2023-03-30
Payer: COMMERCIAL

## 2023-03-30 ENCOUNTER — HOSPITAL ENCOUNTER (OUTPATIENT)
Facility: CLINIC | Age: 43
Discharge: HOME | End: 2023-03-30
Attending: SURGERY | Admitting: SURGERY
Payer: COMMERCIAL

## 2023-03-30 ENCOUNTER — ANESTHESIA EVENT (OUTPATIENT)
Dept: SURGERY | Facility: CLINIC | Age: 43
End: 2023-03-30
Payer: COMMERCIAL

## 2023-03-30 ENCOUNTER — HOSPITAL ENCOUNTER (EMERGENCY)
Facility: CLINIC | Age: 43
Discharge: HOME | End: 2023-03-31
Attending: EMERGENCY MEDICINE
Payer: COMMERCIAL

## 2023-03-30 VITALS
SYSTOLIC BLOOD PRESSURE: 115 MMHG | TEMPERATURE: 96.8 F | DIASTOLIC BLOOD PRESSURE: 86 MMHG | BODY MASS INDEX: 28.22 KG/M2 | HEIGHT: 59 IN | OXYGEN SATURATION: 99 % | WEIGHT: 140 LBS | HEART RATE: 72 BPM | RESPIRATION RATE: 20 BRPM

## 2023-03-30 DIAGNOSIS — R11.10 POSTOPERATIVE VOMITING: ICD-10-CM

## 2023-03-30 DIAGNOSIS — R10.9 POSTOPERATIVE ABDOMINAL PAIN: ICD-10-CM

## 2023-03-30 DIAGNOSIS — Z98.890 POSTOPERATIVE VOMITING: ICD-10-CM

## 2023-03-30 DIAGNOSIS — G89.18 POSTOPERATIVE ABDOMINAL PAIN: ICD-10-CM

## 2023-03-30 DIAGNOSIS — K82.8 BILIARY DYSKINESIA: ICD-10-CM

## 2023-03-30 LAB
ALBUMIN SERPL BCG-MCNC: 5 G/DL (ref 3.5–5.2)
ALP SERPL-CCNC: 86 U/L (ref 35–104)
ALT SERPL W P-5'-P-CCNC: 18 U/L (ref 10–35)
ANION GAP SERPL CALCULATED.3IONS-SCNC: 22 MMOL/L (ref 7–15)
AST SERPL W P-5'-P-CCNC: 33 U/L (ref 10–35)
BASOPHILS # BLD AUTO: 0 10E3/UL (ref 0–0.2)
BASOPHILS NFR BLD AUTO: 0 %
BILIRUB SERPL-MCNC: 0.5 MG/DL
BUN SERPL-MCNC: 8 MG/DL (ref 6–20)
CALCIUM SERPL-MCNC: 9.9 MG/DL (ref 8.6–10)
CHLORIDE SERPL-SCNC: 98 MMOL/L (ref 98–107)
CREAT SERPL-MCNC: 0.67 MG/DL (ref 0.51–0.95)
DEPRECATED HCO3 PLAS-SCNC: 16 MMOL/L (ref 22–29)
EOSINOPHIL # BLD AUTO: 0 10E3/UL (ref 0–0.7)
EOSINOPHIL NFR BLD AUTO: 0 %
ERYTHROCYTE [DISTWIDTH] IN BLOOD BY AUTOMATED COUNT: 12.2 % (ref 10–15)
GFR SERPL CREATININE-BSD FRML MDRD: >90 ML/MIN/1.73M2
GLUCOSE SERPL-MCNC: 139 MG/DL (ref 70–99)
HCT VFR BLD AUTO: 39.7 % (ref 35–47)
HGB BLD-MCNC: 13.7 G/DL (ref 11.7–15.7)
HOLD SPECIMEN: NORMAL
IMM GRANULOCYTES # BLD: 0 10E3/UL
IMM GRANULOCYTES NFR BLD: 0 %
LIPASE SERPL-CCNC: 21 U/L (ref 13–60)
LYMPHOCYTES # BLD AUTO: 0.9 10E3/UL (ref 0.8–5.3)
LYMPHOCYTES NFR BLD AUTO: 9 %
MCH RBC QN AUTO: 31.1 PG (ref 26.5–33)
MCHC RBC AUTO-ENTMCNC: 34.5 G/DL (ref 31.5–36.5)
MCV RBC AUTO: 90 FL (ref 78–100)
MONOCYTES # BLD AUTO: 0.3 10E3/UL (ref 0–1.3)
MONOCYTES NFR BLD AUTO: 3 %
NEUTROPHILS # BLD AUTO: 8.9 10E3/UL (ref 1.6–8.3)
NEUTROPHILS NFR BLD AUTO: 88 %
NRBC # BLD AUTO: 0 10E3/UL
NRBC BLD AUTO-RTO: 0 /100
PLATELET # BLD AUTO: 289 10E3/UL (ref 150–450)
POTASSIUM SERPL-SCNC: 4.3 MMOL/L (ref 3.4–5.3)
PROT SERPL-MCNC: 8 G/DL (ref 6.4–8.3)
RBC # BLD AUTO: 4.4 10E6/UL (ref 3.8–5.2)
SODIUM SERPL-SCNC: 136 MMOL/L (ref 136–145)
WBC # BLD AUTO: 10.1 10E3/UL (ref 4–11)

## 2023-03-30 PROCEDURE — S2900 ROBOTIC SURGICAL SYSTEM: HCPCS | Performed by: SURGERY

## 2023-03-30 PROCEDURE — 999N000141 HC STATISTIC PRE-PROCEDURE NURSING ASSESSMENT: Performed by: SURGERY

## 2023-03-30 PROCEDURE — 258N000003 HC RX IP 258 OP 636: Performed by: NURSE ANESTHETIST, CERTIFIED REGISTERED

## 2023-03-30 PROCEDURE — 96375 TX/PRO/DX INJ NEW DRUG ADDON: CPT

## 2023-03-30 PROCEDURE — 370N000017 HC ANESTHESIA TECHNICAL FEE, PER MIN: Performed by: SURGERY

## 2023-03-30 PROCEDURE — 258N000003 HC RX IP 258 OP 636: Performed by: EMERGENCY MEDICINE

## 2023-03-30 PROCEDURE — 99285 EMERGENCY DEPT VISIT HI MDM: CPT | Mod: 25

## 2023-03-30 PROCEDURE — 85025 COMPLETE CBC W/AUTO DIFF WBC: CPT | Performed by: EMERGENCY MEDICINE

## 2023-03-30 PROCEDURE — 83690 ASSAY OF LIPASE: CPT | Performed by: EMERGENCY MEDICINE

## 2023-03-30 PROCEDURE — 250N000011 HC RX IP 250 OP 636: Performed by: NURSE ANESTHETIST, CERTIFIED REGISTERED

## 2023-03-30 PROCEDURE — 250N000013 HC RX MED GY IP 250 OP 250 PS 637: Performed by: SURGERY

## 2023-03-30 PROCEDURE — 250N000011 HC RX IP 250 OP 636: Performed by: ANESTHESIOLOGY

## 2023-03-30 PROCEDURE — 250N000009 HC RX 250: Performed by: NURSE ANESTHETIST, CERTIFIED REGISTERED

## 2023-03-30 PROCEDURE — 47563 LAPARO CHOLECYSTECTOMY/GRAPH: CPT | Performed by: SURGERY

## 2023-03-30 PROCEDURE — 250N000025 HC SEVOFLURANE, PER MIN: Performed by: SURGERY

## 2023-03-30 PROCEDURE — 47563 LAPARO CHOLECYSTECTOMY/GRAPH: CPT | Mod: AS | Performed by: PHYSICIAN ASSISTANT

## 2023-03-30 PROCEDURE — 250N000011 HC RX IP 250 OP 636: Performed by: EMERGENCY MEDICINE

## 2023-03-30 PROCEDURE — 710N000009 HC RECOVERY PHASE 1, LEVEL 1, PER MIN: Performed by: SURGERY

## 2023-03-30 PROCEDURE — 80053 COMPREHEN METABOLIC PANEL: CPT | Performed by: EMERGENCY MEDICINE

## 2023-03-30 PROCEDURE — 96376 TX/PRO/DX INJ SAME DRUG ADON: CPT

## 2023-03-30 PROCEDURE — 74177 CT ABD & PELVIS W/CONTRAST: CPT

## 2023-03-30 PROCEDURE — 360N000080 HC SURGERY LEVEL 7, PER MIN: Performed by: SURGERY

## 2023-03-30 PROCEDURE — 250N000009 HC RX 250: Performed by: SURGERY

## 2023-03-30 PROCEDURE — 250N000009 HC RX 250: Performed by: EMERGENCY MEDICINE

## 2023-03-30 PROCEDURE — 36415 COLL VENOUS BLD VENIPUNCTURE: CPT | Performed by: EMERGENCY MEDICINE

## 2023-03-30 PROCEDURE — 250N000011 HC RX IP 250 OP 636: Performed by: SURGERY

## 2023-03-30 PROCEDURE — 710N000012 HC RECOVERY PHASE 2, PER MINUTE: Performed by: SURGERY

## 2023-03-30 PROCEDURE — 96374 THER/PROPH/DIAG INJ IV PUSH: CPT | Mod: 59

## 2023-03-30 PROCEDURE — 88304 TISSUE EXAM BY PATHOLOGIST: CPT | Mod: TC | Performed by: SURGERY

## 2023-03-30 PROCEDURE — 272N000001 HC OR GENERAL SUPPLY STERILE: Performed by: SURGERY

## 2023-03-30 PROCEDURE — 88304 TISSUE EXAM BY PATHOLOGIST: CPT | Mod: 26 | Performed by: PATHOLOGY

## 2023-03-30 RX ORDER — FENTANYL CITRATE 50 UG/ML
INJECTION, SOLUTION INTRAMUSCULAR; INTRAVENOUS PRN
Status: DISCONTINUED | OUTPATIENT
Start: 2023-03-30 | End: 2023-03-30

## 2023-03-30 RX ORDER — BUPIVACAINE HYDROCHLORIDE AND EPINEPHRINE 5; 5 MG/ML; UG/ML
INJECTION, SOLUTION PERINEURAL PRN
Status: DISCONTINUED | OUTPATIENT
Start: 2023-03-30 | End: 2023-03-30 | Stop reason: HOSPADM

## 2023-03-30 RX ORDER — NEOSTIGMINE METHYLSULFATE 1 MG/ML
VIAL (ML) INJECTION PRN
Status: DISCONTINUED | OUTPATIENT
Start: 2023-03-30 | End: 2023-03-30

## 2023-03-30 RX ORDER — FENTANYL CITRATE 50 UG/ML
25 INJECTION, SOLUTION INTRAMUSCULAR; INTRAVENOUS EVERY 5 MIN PRN
Status: DISCONTINUED | OUTPATIENT
Start: 2023-03-30 | End: 2023-03-30 | Stop reason: HOSPADM

## 2023-03-30 RX ORDER — METOCLOPRAMIDE HYDROCHLORIDE 5 MG/ML
10 INJECTION INTRAMUSCULAR; INTRAVENOUS ONCE
Status: COMPLETED | OUTPATIENT
Start: 2023-03-30 | End: 2023-03-30

## 2023-03-30 RX ORDER — HYDROMORPHONE HCL IN WATER/PF 6 MG/30 ML
0.4 PATIENT CONTROLLED ANALGESIA SYRINGE INTRAVENOUS EVERY 5 MIN PRN
Status: DISCONTINUED | OUTPATIENT
Start: 2023-03-30 | End: 2023-03-30 | Stop reason: HOSPADM

## 2023-03-30 RX ORDER — FAMOTIDINE 20 MG/1
20 TABLET, FILM COATED ORAL 2 TIMES DAILY
COMMUNITY
End: 2023-03-30

## 2023-03-30 RX ORDER — INDOCYANINE GREEN AND WATER 25 MG
2.5 KIT INJECTION ONCE
Status: COMPLETED | OUTPATIENT
Start: 2023-03-30 | End: 2023-03-30

## 2023-03-30 RX ORDER — ONDANSETRON 2 MG/ML
4 INJECTION INTRAMUSCULAR; INTRAVENOUS
Status: DISCONTINUED | OUTPATIENT
Start: 2023-03-30 | End: 2023-03-31 | Stop reason: HOSPADM

## 2023-03-30 RX ORDER — CEFAZOLIN SODIUM/WATER 2 G/20 ML
2 SYRINGE (ML) INTRAVENOUS SEE ADMIN INSTRUCTIONS
Status: DISCONTINUED | OUTPATIENT
Start: 2023-03-30 | End: 2023-03-30 | Stop reason: HOSPADM

## 2023-03-30 RX ORDER — PROPOFOL 10 MG/ML
INJECTION, EMULSION INTRAVENOUS CONTINUOUS PRN
Status: DISCONTINUED | OUTPATIENT
Start: 2023-03-30 | End: 2023-03-30

## 2023-03-30 RX ORDER — MORPHINE SULFATE 4 MG/ML
4 INJECTION, SOLUTION INTRAMUSCULAR; INTRAVENOUS
Status: DISCONTINUED | OUTPATIENT
Start: 2023-03-30 | End: 2023-03-31 | Stop reason: HOSPADM

## 2023-03-30 RX ORDER — ACETAMINOPHEN 325 MG/1
650 TABLET ORAL
Status: DISCONTINUED | OUTPATIENT
Start: 2023-03-30 | End: 2023-03-30 | Stop reason: HOSPADM

## 2023-03-30 RX ORDER — SODIUM CHLORIDE, SODIUM LACTATE, POTASSIUM CHLORIDE, CALCIUM CHLORIDE 600; 310; 30; 20 MG/100ML; MG/100ML; MG/100ML; MG/100ML
INJECTION, SOLUTION INTRAVENOUS CONTINUOUS
Status: DISCONTINUED | OUTPATIENT
Start: 2023-03-30 | End: 2023-03-30 | Stop reason: HOSPADM

## 2023-03-30 RX ORDER — PROPOFOL 10 MG/ML
INJECTION, EMULSION INTRAVENOUS PRN
Status: DISCONTINUED | OUTPATIENT
Start: 2023-03-30 | End: 2023-03-30

## 2023-03-30 RX ORDER — ONDANSETRON 2 MG/ML
INJECTION INTRAMUSCULAR; INTRAVENOUS PRN
Status: DISCONTINUED | OUTPATIENT
Start: 2023-03-30 | End: 2023-03-30

## 2023-03-30 RX ORDER — ACETAMINOPHEN 325 MG/1
325-650 TABLET ORAL EVERY 6 HOURS PRN
COMMUNITY

## 2023-03-30 RX ORDER — ONDANSETRON 4 MG/1
4 TABLET, ORALLY DISINTEGRATING ORAL EVERY 30 MIN PRN
Status: DISCONTINUED | OUTPATIENT
Start: 2023-03-30 | End: 2023-03-30 | Stop reason: HOSPADM

## 2023-03-30 RX ORDER — DEXAMETHASONE SODIUM PHOSPHATE 4 MG/ML
INJECTION, SOLUTION INTRA-ARTICULAR; INTRALESIONAL; INTRAMUSCULAR; INTRAVENOUS; SOFT TISSUE PRN
Status: DISCONTINUED | OUTPATIENT
Start: 2023-03-30 | End: 2023-03-30

## 2023-03-30 RX ORDER — HYDROMORPHONE HCL IN WATER/PF 6 MG/30 ML
0.2 PATIENT CONTROLLED ANALGESIA SYRINGE INTRAVENOUS EVERY 5 MIN PRN
Status: DISCONTINUED | OUTPATIENT
Start: 2023-03-30 | End: 2023-03-30 | Stop reason: HOSPADM

## 2023-03-30 RX ORDER — FENTANYL CITRATE 50 UG/ML
50 INJECTION, SOLUTION INTRAMUSCULAR; INTRAVENOUS EVERY 5 MIN PRN
Status: DISCONTINUED | OUTPATIENT
Start: 2023-03-30 | End: 2023-03-30 | Stop reason: HOSPADM

## 2023-03-30 RX ORDER — LORAZEPAM 2 MG/ML
0.5 INJECTION INTRAMUSCULAR ONCE
Status: COMPLETED | OUTPATIENT
Start: 2023-03-30 | End: 2023-03-30

## 2023-03-30 RX ORDER — ONDANSETRON 2 MG/ML
4 INJECTION INTRAMUSCULAR; INTRAVENOUS EVERY 30 MIN PRN
Status: DISCONTINUED | OUTPATIENT
Start: 2023-03-30 | End: 2023-03-30 | Stop reason: HOSPADM

## 2023-03-30 RX ORDER — SODIUM CHLORIDE, SODIUM LACTATE, POTASSIUM CHLORIDE, CALCIUM CHLORIDE 600; 310; 30; 20 MG/100ML; MG/100ML; MG/100ML; MG/100ML
INJECTION, SOLUTION INTRAVENOUS CONTINUOUS PRN
Status: DISCONTINUED | OUTPATIENT
Start: 2023-03-30 | End: 2023-03-30

## 2023-03-30 RX ORDER — GLYCOPYRROLATE 0.2 MG/ML
INJECTION, SOLUTION INTRAMUSCULAR; INTRAVENOUS PRN
Status: DISCONTINUED | OUTPATIENT
Start: 2023-03-30 | End: 2023-03-30

## 2023-03-30 RX ORDER — LIDOCAINE HYDROCHLORIDE 20 MG/ML
INJECTION, SOLUTION INFILTRATION; PERINEURAL PRN
Status: DISCONTINUED | OUTPATIENT
Start: 2023-03-30 | End: 2023-03-30

## 2023-03-30 RX ORDER — IOPAMIDOL 755 MG/ML
500 INJECTION, SOLUTION INTRAVASCULAR ONCE
Status: COMPLETED | OUTPATIENT
Start: 2023-03-30 | End: 2023-03-30

## 2023-03-30 RX ORDER — DIPHENHYDRAMINE HYDROCHLORIDE 50 MG/ML
25 INJECTION INTRAMUSCULAR; INTRAVENOUS ONCE
Status: COMPLETED | OUTPATIENT
Start: 2023-03-30 | End: 2023-03-30

## 2023-03-30 RX ORDER — LIDOCAINE 40 MG/G
CREAM TOPICAL
Status: DISCONTINUED | OUTPATIENT
Start: 2023-03-30 | End: 2023-03-30 | Stop reason: HOSPADM

## 2023-03-30 RX ORDER — KETOROLAC TROMETHAMINE 30 MG/ML
30 INJECTION, SOLUTION INTRAMUSCULAR; INTRAVENOUS ONCE
Status: COMPLETED | OUTPATIENT
Start: 2023-03-30 | End: 2023-03-30

## 2023-03-30 RX ORDER — ONDANSETRON 4 MG/1
4-8 TABLET, FILM COATED ORAL 3 TIMES DAILY PRN
COMMUNITY
Start: 2023-03-06

## 2023-03-30 RX ORDER — CEFAZOLIN SODIUM/WATER 2 G/20 ML
2 SYRINGE (ML) INTRAVENOUS
Status: COMPLETED | OUTPATIENT
Start: 2023-03-30 | End: 2023-03-30

## 2023-03-30 RX ADMIN — MIDAZOLAM 1 MG: 1 INJECTION INTRAMUSCULAR; INTRAVENOUS at 10:17

## 2023-03-30 RX ADMIN — SODIUM CHLORIDE 58 ML: 9 INJECTION, SOLUTION INTRAVENOUS at 21:12

## 2023-03-30 RX ADMIN — MORPHINE SULFATE 4 MG: 4 INJECTION, SOLUTION INTRAMUSCULAR; INTRAVENOUS at 21:35

## 2023-03-30 RX ADMIN — ONDANSETRON 4 MG: 2 INJECTION INTRAMUSCULAR; INTRAVENOUS at 20:57

## 2023-03-30 RX ADMIN — PROPOFOL 150 MG: 10 INJECTION, EMULSION INTRAVENOUS at 10:24

## 2023-03-30 RX ADMIN — LORAZEPAM 0.5 MG: 2 INJECTION INTRAMUSCULAR; INTRAVENOUS at 21:48

## 2023-03-30 RX ADMIN — ONDANSETRON 4 MG: 2 INJECTION INTRAMUSCULAR; INTRAVENOUS at 10:56

## 2023-03-30 RX ADMIN — ONDANSETRON 4 MG: 2 INJECTION INTRAMUSCULAR; INTRAVENOUS at 11:51

## 2023-03-30 RX ADMIN — KETOROLAC TROMETHAMINE 30 MG: 30 INJECTION, SOLUTION INTRAMUSCULAR; INTRAVENOUS at 12:31

## 2023-03-30 RX ADMIN — MORPHINE SULFATE 4 MG: 4 INJECTION, SOLUTION INTRAMUSCULAR; INTRAVENOUS at 20:59

## 2023-03-30 RX ADMIN — SODIUM CHLORIDE 1000 ML: 9 INJECTION, SOLUTION INTRAVENOUS at 20:56

## 2023-03-30 RX ADMIN — MIDAZOLAM 2 MG: 1 INJECTION INTRAMUSCULAR; INTRAVENOUS at 09:10

## 2023-03-30 RX ADMIN — DEXAMETHASONE SODIUM PHOSPHATE 4 MG: 4 INJECTION, SOLUTION INTRA-ARTICULAR; INTRALESIONAL; INTRAMUSCULAR; INTRAVENOUS; SOFT TISSUE at 10:24

## 2023-03-30 RX ADMIN — GLYCOPYRROLATE 0.2 MG: 0.2 INJECTION, SOLUTION INTRAMUSCULAR; INTRAVENOUS at 10:24

## 2023-03-30 RX ADMIN — HYDROMORPHONE HYDROCHLORIDE 0.5 MG: 1 INJECTION, SOLUTION INTRAMUSCULAR; INTRAVENOUS; SUBCUTANEOUS at 10:40

## 2023-03-30 RX ADMIN — Medication 2 G: at 10:17

## 2023-03-30 RX ADMIN — HYDROMORPHONE HYDROCHLORIDE 0.5 MG: 1 INJECTION, SOLUTION INTRAMUSCULAR; INTRAVENOUS; SUBCUTANEOUS at 10:54

## 2023-03-30 RX ADMIN — GLYCOPYRROLATE 0.3 MG: 0.2 INJECTION, SOLUTION INTRAMUSCULAR; INTRAVENOUS at 11:16

## 2023-03-30 RX ADMIN — PROPOFOL 50 MCG/KG/MIN: 10 INJECTION, EMULSION INTRAVENOUS at 10:36

## 2023-03-30 RX ADMIN — HYDROMORPHONE HYDROCHLORIDE 0.5 MG: 1 INJECTION, SOLUTION INTRAMUSCULAR; INTRAVENOUS; SUBCUTANEOUS at 10:43

## 2023-03-30 RX ADMIN — ROCURONIUM BROMIDE 40 MG: 50 INJECTION, SOLUTION INTRAVENOUS at 10:24

## 2023-03-30 RX ADMIN — FENTANYL CITRATE 100 MCG: 50 INJECTION, SOLUTION INTRAMUSCULAR; INTRAVENOUS at 10:24

## 2023-03-30 RX ADMIN — DIPHENHYDRAMINE HYDROCHLORIDE 25 MG: 50 INJECTION INTRAMUSCULAR; INTRAVENOUS at 21:52

## 2023-03-30 RX ADMIN — ACETAMINOPHEN 650 MG: 325 TABLET ORAL at 12:31

## 2023-03-30 RX ADMIN — METOCLOPRAMIDE HYDROCHLORIDE 10 MG: 5 INJECTION INTRAMUSCULAR; INTRAVENOUS at 21:50

## 2023-03-30 RX ADMIN — SODIUM CHLORIDE, POTASSIUM CHLORIDE, SODIUM LACTATE AND CALCIUM CHLORIDE: 600; 310; 30; 20 INJECTION, SOLUTION INTRAVENOUS at 10:17

## 2023-03-30 RX ADMIN — NEOSTIGMINE METHYLSULFATE 3 MG: 1 INJECTION, SOLUTION INTRAVENOUS at 11:16

## 2023-03-30 RX ADMIN — IOPAMIDOL 71 ML: 755 INJECTION, SOLUTION INTRAVENOUS at 21:12

## 2023-03-30 RX ADMIN — LIDOCAINE HYDROCHLORIDE 30 MG: 20 INJECTION, SOLUTION INFILTRATION; PERINEURAL at 10:24

## 2023-03-30 RX ADMIN — INDOCYANINE GREEN AND WATER 2.5 MG: KIT at 09:13

## 2023-03-30 ASSESSMENT — ACTIVITIES OF DAILY LIVING (ADL)
ADLS_ACUITY_SCORE: 35

## 2023-03-30 NOTE — INTERVAL H&P NOTE
"I have reviewed the surgical (or preoperative) H&P that is linked to this encounter, and examined the patient. There are no significant changes    Clinical Conditions Present on Arrival:  Clinically Significant Risk Factors Present on Admission                    # Overweight: Estimated body mass index is 29.29 kg/m  as calculated from the following:    Height as of 3/14/23: 1.499 m (4' 11\").    Weight as of 3/14/23: 65.8 kg (145 lb).       "

## 2023-03-30 NOTE — ANESTHESIA PREPROCEDURE EVALUATION
Anesthesia Pre-Procedure Evaluation    Patient: Kayley Scruggs   MRN: [id number] : 1980        Procedure : Procedure(s):  Xi Robotic Assisted CHOLECYSTECTOMY, LAPAROSCOPIC, WITHOUT CHOLANGIOGRAM          History reviewed. No pertinent past medical history.   Past Surgical History:   Procedure Laterality Date     HYSTERECTOMY       LAPAROSCOPIC NISSEN FUNDOPLICATION  2000     SPINE SURGERY  2018      Allergies   Allergen Reactions     Pearson [Nuts] Anaphylaxis     No Clinical Screening - See Comments Anaphylaxis     PN: Raw fruits and vegetables     Ciprofloxacin GI Disturbance and Nausea and Vomiting     Other reaction(s): Kidney failure  Hyperemesis, ended up in hospital with acute kidney failure.        Imitrex [Sumatriptan]      Sulfa Drugs      Other reaction(s): Renal Failure      Social History     Tobacco Use     Smoking status: Former     Smokeless tobacco: Never   Substance Use Topics     Alcohol use: No      Wt Readings from Last 1 Encounters:   23 65.8 kg (145 lb)        Anesthesia Evaluation   Pt has had prior anesthetic. Type: General.    No history of anesthetic complications       ROS/MED HX  ENT/Pulmonary:  - neg pulmonary ROS     Neurologic:     (+) migraines,     Cardiovascular:  - neg cardiovascular ROS     METS/Exercise Tolerance: >4 METS    Hematologic:  - neg hematologic  ROS     Musculoskeletal: Comment: Myositis      GI/Hepatic:     (+) cholecystitis/cholelithiasis,     Renal/Genitourinary:  - neg Renal ROS     Endo:  - neg endo ROS     Psychiatric/Substance Use:     (+) psychiatric history depression     Infectious Disease:  - neg infectious disease ROS     Malignancy:       Other:      (+) , H/O Chronic Pain (Chronic neck pain s/p C5-6 fusion),        Physical Exam    Airway        Mallampati: II   TM distance: > 3 FB   Neck ROM: full   Mouth opening: > 3 cm    Respiratory Devices and Support         Dental           Cardiovascular   cardiovascular exam normal          Pulmonary    pulmonary exam normal            Other findings: Lab Test        12/21/17 2039          WBC          10.1          HGB          14.5          MCV          89            PLT          313            Lab Test        12/21/17 2039          NA           138           POTASSIUM    3.7           CHLORIDE     105           CO2          17*           BUN          9             CR           0.59          ANIONGAP     16*           CANDY          9.8           GLC          117*            OUTSIDE LABS:  CBC:   Lab Results   Component Value Date    WBC 10.1 12/21/2017    HGB 14.5 12/21/2017    HCT 41.3 12/21/2017     12/21/2017     BMP:   Lab Results   Component Value Date     12/21/2017    POTASSIUM 3.7 12/21/2017    CHLORIDE 105 12/21/2017    CO2 17 (L) 12/21/2017    BUN 9 12/21/2017    CR 0.59 12/21/2017     (H) 12/21/2017     COAGS: No results found for: PTT, INR, FIBR  POC: No results found for: BGM, HCG, HCGS  HEPATIC:   Lab Results   Component Value Date    ALBUMIN 4.5 12/21/2017    PROTTOTAL 8.3 12/21/2017    ALT 22 12/21/2017    AST 19 12/21/2017    ALKPHOS 77 12/21/2017    BILITOTAL 0.7 12/21/2017     OTHER:   Lab Results   Component Value Date    CANDY 9.8 12/21/2017       Anesthesia Plan    ASA Status:  2      Anesthesia Type: General.     - Airway: ETT   Induction: Propofol.   Maintenance: Balanced.   Techniques and Equipment:     - Airway: Video-Laryngoscope         Consents    Anesthesia Plan(s) and associated risks, benefits, and realistic alternatives discussed. Questions answered and patient/representative(s) expressed understanding.    - Discussed:     - Discussed with:  Patient      - Extended Intubation/Ventilatory Support Discussed: No.      - Patient is DNR/DNI Status: No    Use of blood products discussed: No .     Postoperative Care    Pain management: IV analgesics, Oral pain medications.   PONV prophylaxis: Ondansetron (or other 5HT-3),  Background Propofol Infusion     Comments:                Gary Guillaume MD

## 2023-03-30 NOTE — ANESTHESIA POSTPROCEDURE EVALUATION
Patient: Kayley Scruggs    Procedure: Procedure(s):  Xi Robotic Assisted CHOLECYSTECTOMY, LAPAROSCOPIC, WITHOUT CHOLANGIOGRAM       Anesthesia Type:  General    Note:  Disposition: Outpatient   Postop Pain Control: Uneventful            Sign Out: Well controlled pain   PONV: No   Neuro/Psych: Uneventful            Sign Out: Acceptable/Baseline neuro status   Airway/Respiratory: Uneventful            Sign Out: Acceptable/Baseline resp. status   CV/Hemodynamics: Uneventful            Sign Out: Acceptable CV status; No obvious hypovolemia; No obvious fluid overload   Other NRE: NONE   DID A NON-ROUTINE EVENT OCCUR? No           Last vitals:  Vitals Value Taken Time   /90 03/30/23 1235   Temp 97.2  F (36.2  C) 03/30/23 1230   Pulse 72 03/30/23 1238   Resp 20 03/30/23 1238   SpO2 100 % 03/30/23 1239   Vitals shown include unvalidated device data.    Electronically Signed By: Gary Guillaume MD  March 30, 2023  1:20 PM

## 2023-03-30 NOTE — DISCHARGE INSTRUCTIONS
Dr. Hunter  [facility] [phone]     Maximum acetaminophen (Tylenol) dose from all sources should not exceed 4 grams (4000 mg) per day. You can take tylenol as needed.     GENERAL ANESTHESIA OR SEDATION ADULT DISCHARGE INSTRUCTIONS   SPECIAL PRECAUTIONS FOR 24 HOURS AFTER SURGERY    IT IS NOT UNUSUAL TO FEEL LIGHT-HEADED OR FAINT, UP TO 24 HOURS AFTER SURGERY OR WHILE TAKING PAIN MEDICATION.  IF YOU HAVE THESE SYMPTOMS; SIT FOR A FEW MINUTES BEFORE STANDING AND HAVE SOMEONE ASSIST YOU WHEN YOU GET UP TO WALK OR USE THE BATHROOM.    YOU SHOULD REST AND RELAX FOR THE NEXT 24 HOURS AND YOU MUST MAKE ARRANGEMENTS TO HAVE SOMEONE STAY WITH YOU FOR AT LEAST 24 HOURS AFTER YOUR DISCHARGE.  AVOID HAZARDOUS AND STRENUOUS ACTIVITIES.  DO NOT MAKE IMPORTANT DECISIONS FOR 24 HOURS.    DO NOT DRIVE ANY VEHICLE OR OPERATE MECHANICAL EQUIPMENT FOR 24 HOURS FOLLOWING THE END OF YOUR SURGERY.  EVEN THOUGH YOU MAY FEEL NORMAL, YOUR REACTIONS MAY BE AFFECTED BY THE MEDICATION YOU HAVE RECEIVED.    DO NOT DRINK ALCOHOLIC BEVERAGES FOR 24 HOURS FOLLOWING YOUR SURGERY.    DRINK CLEAR LIQUIDS (APPLE JUICE, GINGER ALE, 7-UP, BROTH, ETC.).  PROGRESS TO YOUR REGULAR DIET AS YOU FEEL ABLE.    YOU MAY HAVE A DRY MOUTH, A SORE THROAT, MUSCLES ACHES OR TROUBLE SLEEPING.  THESE SHOULD GO AWAY AFTER 24 HOURS.    CALL YOUR DOCTOR FOR ANY OF THE FOLLOWING:  SIGNS OF INFECTION (FEVER, GROWING TENDERNESS AT THE SURGERY SITE, A LARGE AMOUNT OF DRAINAGE OR BLEEDING, SEVERE PAIN, FOUL-SMELLING DRAINAGE, REDNESS OR SWELLING.    IT HAS BEEN OVER 8 TO 10 HOURS SINCE SURGERY AND YOU ARE STILL NOT ABLE TO URINATE (PASS WATER).               HOME CARE FOLLOWING LAPAROSCOPIC CHOLECYSTECTOMY  PATRICK Pereira, SHELLIE Hernandez C. Pratt, J. Shaheen    INCISIONAL CARE:  Replace the bandage over your incisions DAILY until all drainage stops, or if more comfortable to have in place.  If present, leave the steri-strips (white paper  tapes) in place for 14 days after surgery.  If Dermabond (a type of skin glue) is present, leave in place until it wears/flakes off (2-3 weeks).     BATHING:  OK to shower 24 hours after surgery.  Avoid baths for 1 week after surgery.  You may wash your hair at any time.  Gently pat your incision dry after bathing.  Do not apply lotions, creams, or ointments to incisions.    ACTIVITY:  Light Activity -- you may immediately be up and about as tolerated.  Walking is encouraged, increase as tolerated.  Driving/Light Work-- when comfortable   Strenuous Work/Activity -- no restrictions.  Active Sports (running, biking, etc.) -- cautiously resume  as you feel improved    DISCOMFORT:  Local anesthetic placed at surgery should provide relief for 4-8 hours.  Begin taking pain pills before discomfort is severe.  Take the pain medication with some food, when possible, to minimize side effects.  Intermittent use of ice packs may help during the first 1-3 weeks after surgery.  Expect gradual improvement.    Recommend the following over the counter medications:  - Ibuprofen (motrin) 600mg every 6 hours (max 2,400mg per day)   - Tylenol (acetaminophen) 500-1000mg every 6 hours (max 4,000mg per day)  After laparoscopic cholecystectomy, you may have shoulder or upper back discomfort due to the gas used during surgery.  This is temporary and should resolve within 2-3 days.  Frequent short walks may help with this.  You may have decreased energy level for 1-2 weeks after surgery related to your recovery.    DIET:  Start with liquids and gradually increase diet as tolerated.  Drink plenty of fluids.  While taking pain medications, consider use of a stool softener, increase your fiber in your diet, or add a fiber supplement (like Metamucil, Citrucel) to help prevent constipation - a possible side effect of pain medications.  It is not uncommon to experience some bowel changes (loose stools or constipation) after surgery.  Your body has  to adapt to you no longer having a gall bladder.  To help minimize this side effect, avoid fatty foods for 1-2 weeks after surgery.  You may then slowly increase the amount of fatty foods in your diet.      NAUSEA:  If nauseated from the anesthetic/pain meds; rest in bed, get up cautiously with assistance, and drink clear liquids (juice, tea, broth).    FOLLOW-UP AFTER SURGERY:  -Our office will contact you approximately 2-3 weeks after surgery to check on your progress and answer any questions you may have.  If you are doing well, you will not need to return for an office appointment.  If any concerns are identified over the phone, we will help you make an appointment to see a provider.    -If you have not received a phone call, have any questions or concerns, or would like to be seen, please call us at [phone].  We are located at: [address], [address]; [place] [address]    -CONTACT US IF THE FOLLOWING DEVELOPS:   1. A fever that is above 101     2. Increased redness, warmth, drainage, bleeding, or swelling.   3. Pain that is not relieved by rest/ice and your prescription.   4.  Increasing pain after 48 hours.   5. Drainage that is thick, cloudy, yellow, green or white.   6. Any other questions or concerns.      FREQUENTLY ASKED QUESTIONS:    Q:  How should my incision look?    A:  Normally your incision will appear slightly swollen with light redness directly along the incision itself as it heals.  It may feel like a bump or ridge as the healing/scarring happens, and over time (3-4 months) this bump or ridge feeling should slowly go away.  In general, clear or pink watery drainage can be normal at first as your incision heals, but should decrease over time.    Q:  How do I know if my incision is infected?  A:  Look at your incision for signs of infection, like redness around the incision spreading to surrounding skin, or drainage of cloudy or foul-smelling drainage.  If you feel warm, check your  temperature to see if you are running a fever.    **If any of these things occur, please notify the nurse at our office.  We may need you to come into the office for an incision check.      Q:  How do I take care of my incision?  A:  If you have a dressing in place - Starting the day after surgery, replace the dressing 1-2 times a day until there is no further drainage from the incision.  At that time, a dressing is no longer needed.  Try to minimize tape on the skin if irritation is occurring at the tape sites.  If you have significant irritation from tape on the skin, please call the office to discuss other method of dressing your incision.    Small pieces of tape called  steri-strips  may be present directly overlying your incision; these may be removed 10 days after surgery unless otherwise specified by your surgeon.  If these tapes start to loosen at the ends, you may trim them back until they fall off or are removed.    A:  If you had  Dermabond  tissue glue used as a dressing (this causes your incision to look shiny with a clear covering over it) - This type of dressing wears off with time and does not require more dressings over the top unless it is draining around the glue as it wears off.  Do not apply ointments or lotions over the incisions until the glue has completely worn off.    Q:  There is a piece of tape or a sticky  lead  still on my skin.  Can I remove this?  A:  Sometimes the sticky  leads  used for monitoring during surgery or for evaluation in the emergency department are not all removed while you are in the hospital.  These sometimes have a tab or metal dot on them.  You can easily remove these on your own, like taking off a band-aid.  If there is a gel substance under the  lead , simply wipe/clean it off with a washcloth or paper towel.      Q:  What can I do to minimize constipation (very hard stools, or lack of stools)?  A:  Stay well hydrated.  Increase your dietary fiber intake or take a  fiber supplement -with plenty of water.  Walk around frequently.  You may consider an over-the-counter stool-softener.  Your Pharmacist can assist you with choosing one that is stocked at your pharmacy.  Constipation is also one of the most common side effects of pain medication.  If you are using pain medication, be pro-active and try to PREVENT problems with constipation by taking the steps above BEFORE constipation becomes a problem.    Q:  What do I do if I need more pain medications?  A:  Call the office to receive refills.  Be aware that certain pain meds cannot be called into a pharmacy and actually require a paper prescription.  A change may be made in your pain med as you progress thru your recovery period or if you have side effects to certain meds.    --Pain meds are NOT refilled after 5pm on weekdays, and NOT AT ALL on the weekends, so please look ahead to prevent problems.      Q:  Why am I having a hard time sleeping now that I am at home?  A:  Many medications you receive while you are in the hospital can impact your sleep for a number of days after your surgery/hospitalization.  Decreased level of activity and naps during the day may also make sleeping at night difficult.  Try to minimize day-time naps, and get up frequently during the day to walk around your home during your recovery time.  Sleep aides may be of some help, but are not recommended for long-term use.      Q:  I am having some back discomfort.  What should I do?  A:  This may be related to certain positioning that was required for your surgery, extended periods of time in bed, or other changes in your overall activity level.  You may try ice, heat, acetaminophen, or ibuprofen to treat this temporarily.  Note that many pain medications have acetaminophen in them and would state this on the prescription bottle.  Be sure not to exceed the maximum of 4000mg per day of acetaminophen.     **If the pain you are having does not resolve, is  severe, or is a flare of back pain you have had on other occasions prior to surgery, please contact your primary physician for further recommendations or for an appointment to be examined at their office.    Q:  Why am I having headaches?  A:  Headaches can be caused by many things:  caffeine withdrawal, use of pain meds, dehydration, high blood pressure, lack of sleep, over-activity/exhaustion, flare-up of usual migraine headaches.  If you feel this is related to muscle tension (a band-like feeling around the head, or a pressure at the low-back of the head) you may try ice or heat to this area.  You may need to drink more fluids (try electrolyte drink like Gatorade), rest, or take your usual migraine medications.   **If your headaches do not resolve, worsen, are accompanied by other symptoms, or if your blood pressure is high, please call your primary physician for recommendation and/or examination.    Q:  I am unable to urinate.  What do I do?  A:  A small percentage of people can have difficulty urinating initially after surgery.  This includes being able to urinate only a very small amount at a time and feeling discomfort or pressure in the very low abdomen.  This is called  urinary retention , and is actually an urgent situation.  Proceed to your nearest Emergency department for evaluation (not an Urgent Care Center).  Sometimes the bladder does not work correctly after certain medications you receive during surgery, or related to certain procedures.  You may need to have a catheter placed until your bladder recovers.  When planning to go to an Emergency department, it may help to call the ER to let them know you are coming in for this problem after a surgery.  This may help you get in quicker to be evaluated.  **If you have symptoms of a urinary tract infection, please contact your primary physician for the proper evaluation and treatment.          If you have other questions, please call the office Monday  thru Friday between 8am and 4:30pm to discuss with the nurse or physician assistant.  [phone]    There is a surgeon ON CALL on weekday evenings and over the weekend in case of urgent need only, and may be contacted at the same number.    If you are having an emergency, call 911 or proceed to your nearest emergency department.    You received Toradol, an IV form of Ibuprofen (Motrin) at 12:30pm.  Do not take any Ibuprofen products until 6:30pm.     Maximum acetaminophen (Tylenol) dose from all sources should not exceed 4 grams (4000 mg) per day. You last had 650mg at 12:30pm.

## 2023-03-30 NOTE — ANESTHESIA CARE TRANSFER NOTE
Patient: Kayley Scruggs    Procedure: Procedure(s):  Xi Robotic Assisted CHOLECYSTECTOMY, LAPAROSCOPIC, WITHOUT CHOLANGIOGRAM       Diagnosis: Biliary dyskinesia [K82.8]  Diagnosis Additional Information: No value filed.    Anesthesia Type:   General     Note:    Oropharynx: spontaneously breathing and oropharynx clear of all foreign objects  Level of Consciousness: awake  Oxygen Supplementation: face mask    Independent Airway: airway patency satisfactory and stable  Dentition: dentition unchanged  Vital Signs Stable: post-procedure vital signs reviewed and stable  Report to RN Given: handoff report given  Patient transferred to: PACU  Comments: Report and signed off to RN in PACU.  Good Resps, skin pink, VSS, O2 via face tent.  Handoff Report: Identifed the Patient, Identified the Reponsible Provider, Reviewed the pertinent medical history, Discussed the surgical course, Reviewed Intra-OP anesthesia mangement and issues during anesthesia, Set expectations for post-procedure period and Allowed opportunity for questions and acknowledgement of understanding      Vitals:  Vitals Value Taken Time   /57 03/30/23 1137   Temp     Pulse 91 03/30/23 1139   Resp 17 03/30/23 1139   SpO2 99 % 03/30/23 1139   Vitals shown include unvalidated device data.    Electronically Signed By: JORGE LUIS Arcos CRNA  March 30, 2023  11:41 AM

## 2023-03-30 NOTE — OP NOTE
[facility] General Surgery Operative Note    Pre-operative diagnosis: biliary dyskinesia   Post-operative diagnosis: same   Procedure: robotic cholecystectomy   Surgeon: Norma Hunter MD   Assistant(s): Andree Hess PA-C  The Physician Assistant was medically necessary for their expertise in prepping, robotic instrument exchange, passing of material through port sites, closure of port sites, suturing and retraction.   Anesthesia: general   Estimated blood loss:  Specimen: 10 cc  gallbladder and contents               INDICATION FOR OPERATION: This is a 42 year old female who had an extensive workup with gastroenterology for postprandial abdominal epigastric pain. See consultation note from this month. HIDA scan showed low ejection fraction suggesting biliary dyskinesia. We discussed robotic assisted laparoscopic cholecystectomy and the patient agreed to proceed after hearing the risks and benefits.    DESCRIPTION OF PROCEDURE:  The patient was taken to the operating room and placed on the table in supine position.  General endotracheal anesthesia was induced and the abdomen was prepped and draped in standard sterile fashion.    Access was gained in the left upper quadrant at [place] point with a 5mm 0 degree laparscopic with a visiport trocar under direct visualization.   The abdomen was insufflated with CO2.  Additional 8mm robotic ports were placed in an oblique line from right lower quadrant with even spacing to the left upper quadrant port which was replaced with an 8mm robotic port. There were a few omental adhesions to the anterior abdominal wall in the left side which were left alone. The patient was placed in reverse Trendelenburg and right side up.  The robot was then docked.    The fundus of the gallbladder was grasped and retracted cephalad with a prograsp.  The infundibulum was grasped and retracted laterally.  The peritoneum over the medial and lateral aspects of the triangle of Calot was  taken down with blunt dissection and cautery.  The cystic duct and artery were freed up from surrounding tissues.  The triangle of Calot was skeletonized revealing the critical view of safety.  Intraoperative fluorescence was used to evaluate the biliary anatomy with no additional biliary structures lighting up other than the cystic duct and common bile duct.    The duct was clipped twice proximally, once distally, and the cystic artery was clipped once proximally, then both were transected, using cautery on the distal side of the cystic duct.  The gallbladder was then removed from the liver using the cautery.  Before the gallbladder was completely removed from the cystic plate fluorescence was again used to evaluate for any additional biliary structures and none seen. The gallbladder was passed into an Endocatch bag in the left upper quadrant port site. We observed the right upper quadrant carefully for hemostasis.  Hemostasis was assured.      The endocatch bag was removed from the abdomen. After removal of the bag, there was no evidence of fascial defect requiring closure.     All of the incisions were closed with interrupted 4-0 Vicryl subcuticular sutures and Steri-Strips.  The patient tolerated the procedure well.  Sponge and instrument counts were correct.      FINDINGS: gallbladder without inflammation and no obvious stones palpated    Norma Hunter MD

## 2023-03-30 NOTE — ANESTHESIA PROCEDURE NOTES
Airway       Patient location during procedure: OR       Procedure Start/Stop Times: 3/30/2023 10:27 AM  Staff -        Anesthesiologist:  Tricia Mckeon APRN CRNA       Performed By: CRNA  Consent for Airway        Urgency: elective  Indications and Patient Condition       Indications for airway management: daniel-procedural       Induction type:intravenous       Mask difficulty assessment: 1 - vent by mask    Final Airway Details       Final airway type: endotracheal airway       Successful airway: ETT - single and Oral  Endotracheal Airway Details        ETT size (mm): 7.0       Cuffed: yes       Successful intubation technique: video laryngoscopy       VL Blade Size: Glidescope 3       Grade View of Cords: 1       Adjucts: stylet       Position: Right       Bite block used: Soft    Post intubation assessment        Placement verified by: capnometry, equal breath sounds and chest rise        Number of attempts at approach: 1       Secured with: plastic tape       Ease of procedure: easy       Dentition: Intact    Medication(s) Administered   Medication Administration Time: 3/30/2023 10:27 AM

## 2023-03-31 VITALS
DIASTOLIC BLOOD PRESSURE: 80 MMHG | SYSTOLIC BLOOD PRESSURE: 109 MMHG | TEMPERATURE: 98.6 F | HEART RATE: 77 BPM | OXYGEN SATURATION: 100 % | RESPIRATION RATE: 24 BRPM

## 2023-03-31 LAB
PATH REPORT.COMMENTS IMP SPEC: NORMAL
PATH REPORT.COMMENTS IMP SPEC: NORMAL
PATH REPORT.FINAL DX SPEC: NORMAL
PATH REPORT.GROSS SPEC: NORMAL
PATH REPORT.MICROSCOPIC SPEC OTHER STN: NORMAL
PATH REPORT.RELEVANT HX SPEC: NORMAL
PHOTO IMAGE: NORMAL

## 2023-03-31 RX ORDER — HYDROCODONE BITARTRATE AND ACETAMINOPHEN 5; 325 MG/1; MG/1
1-2 TABLET ORAL EVERY 6 HOURS PRN
Qty: 10 TABLET | Refills: 0 | Status: SHIPPED | OUTPATIENT
Start: 2023-03-31 | End: 2023-04-03

## 2023-03-31 ASSESSMENT — ACTIVITIES OF DAILY LIVING (ADL): ADLS_ACUITY_SCORE: 35

## 2023-03-31 NOTE — ED NOTES
[facility]  ED Nurse Handoff Report    Kayley Scruggs is a 42 year old female   ED Chief complaint: Abdominal Pain  . ED Diagnosis:   Final diagnoses:   None     Allergies:   Allergies   Allergen Reactions    [place] [Nuts] Anaphylaxis    No Clinical Screening - See Comments Anaphylaxis     PN: Raw fruits and vegetables    Ciprofloxacin GI Disturbance and Nausea and Vomiting     Other reaction(s): Kidney failure  Hyperemesis, ended up in hospital with acute kidney failure.       Imitrex [Sumatriptan]     Sulfa Drugs      Other reaction(s): Renal Failure       Code Status: Full Code  Activity level - Baseline/Home:  Independent. Activity Level - Current:   Assist X 1. Lift room needed: No. Bariatric: No   Needed: No   Isolation: No. Infection: Not Applicable.     Vital Signs:   Vitals:    03/30/23 2032 03/30/23 2134   BP: 106/70 120/80   Pulse: 72 77   Resp: 24    Temp: 98.6  F (37  C)    TempSrc: Temporal    SpO2: 100% 100%       Cardiac Rhythm:  ,      Pain level:    Patient confused: No. Patient Falls Risk: No.   Elimination Status: Has voided   Patient Report - Initial Complaint: Pt brought by EMS from home, pt had gallbladder removed today and was discharge at 1400. Pt awoke to extreme pain at 1830, pt had multiple episodes of vomiting. PT attempted ibuprofen and tylenol at home with no effect, pt did vomit immediately after taking them. EMS gave 30mg toradol, 4 mg zofran, and 5 mg morphine IM while en route. Focused Assessment: Gastrointestinal Gastrointestinal WDL: .WDL except; GI symptoms  (Pt had gallbladder removed this afternoon)  GI Signs/Symptoms: abdominal discomfort; nausea; vomiting    Tests Performed: labs and imaging. Abnormal Results:   Abnormal Labs Resulted from Time of ED Arrival to Time of ED Departure   COMPREHENSIVE METABOLIC PANEL - Abnormal       Result Value    Sodium 136      Potassium 4.3      Chloride 98      Carbon Dioxide (CO2) 16 (*)     Anion Gap 22 (*)      Urea Nitrogen 8.0      Creatinine 0.67      Calcium 9.9      Glucose 139 (*)     Alkaline Phosphatase 86      AST 33      ALT 18      Protein Total 8.0      Albumin 5.0      Bilirubin Total 0.5      GFR Estimate >90     CBC WITH PLATELETS AND DIFFERENTIAL - Abnormal    WBC Count 10.1      RBC Count 4.40      Hemoglobin 13.7      Hematocrit 39.7      MCV 90      MCH 31.1      MCHC 34.5      RDW 12.2      Platelet Count 289      % Neutrophils 88      % Lymphocytes 9      % Monocytes 3      % Eosinophils 0      % Basophils 0      % Immature Granulocytes 0      NRBCs per 100 WBC 0      Absolute Neutrophils 8.9 (*)     Absolute Lymphocytes 0.9      Absolute Monocytes 0.3      Absolute Eosinophils 0.0      Absolute Basophils 0.0      Absolute Immature Granulocytes 0.0      Absolute NRBCs 0.0       CT Abdomen Pelvis w Contrast   Final Result   IMPRESSION:    1.  Interval cholecystectomy. Expected trace pneumoperitoneum.   2.  Possible mild small bowel ileus.           Treatments provided: see MAR  Family Comments:  at bedside  OBS brochure/video discussed/provided to patient:  N/A  ED Medications:   Medications   morphine (PF) injection 4 mg (4 mg Intravenous $Given 3/30/23 2135)   ondansetron (ZOFRAN) injection 4 mg (4 mg Intravenous $Given 3/30/23 2057)   0.9% sodium chloride BOLUS (1,000 mLs Intravenous $New Bag 3/30/23 2056)   CT Scan Flush (58 mLs Intravenous $Given 3/30/23 2112)   iopamidol (ISOVUE-370) solution 500 mL (71 mLs Intravenous $Given 3/30/23 2112)   LORazepam (ATIVAN) injection 0.5 mg (0.5 mg Intravenous $Given 3/30/23 2148)   diphenhydrAMINE (BENADRYL) injection 25 mg (25 mg Intravenous $Given 3/30/23 2152)   metoclopramide (REGLAN) injection 10 mg (10 mg Intravenous $Given 3/30/23 2150)     Drips infusing:  No  For the majority of the shift, the patient's behavior Green. Interventions performed were n/a.    Sepsis treatment initiated: No     Patient tested for COVID 19 prior to admission:  NO    ED Nurse Name/Phone Number: Meenu Oneill RN,   9:58 PM

## 2023-03-31 NOTE — ED TRIAGE NOTES
Pt brought by EMS from home, pt had gallbladder removed today and was discharge at 1400. Pt awoke to extreme pain at 1830, pt had multiple episodes of vomiting. PT attempted ibuprofen and tylenol at home with no effect, pt did vomit immediately after taking them. EMS gave 30mg toradol, 4 mg zofran, and 5 mg morphine IM while en route     Triage Assessment     Row Name 03/30/23 2035       Triage Assessment (Adult)    Airway WDL WDL       Respiratory WDL    Respiratory WDL WDL       Skin Circulation/Temperature WDL    Skin Circulation/Temperature WDL WDL       Cardiac WDL    Cardiac WDL WDL       Peripheral/Neurovascular WDL    Peripheral Neurovascular WDL WDL       Cognitive/Neuro/Behavioral WDL    Cognitive/Neuro/Behavioral WDL WDL

## 2023-03-31 NOTE — ED PROVIDER NOTES
History     Chief Complaint:  Abdominal Pain       HPI   Kayley Scruggs is a 42 year old female with a history of cholecystectomy today who presents with postoperative abdominal pain, nausea, vomiting, and whole body paresthesias.  Patient presents noting that initially things felt well, but she developed some abdominal pain, GERD, nausea, and vomiting later this evening.  She received Zofran from EMS with some improvement but notes whole body paresthesias..      Review of External Notes: Reviewed operative note from today    ROS:  Review of Systems    Allergies:  Plains [Nuts]  No Clinical Screening - See Comments  Ciprofloxacin  Imitrex [Sumatriptan]  Sulfa Drugs     Medications:    acetaminophen (TYLENOL) 325 MG tablet  butalbital-acetaminophen-caffeine (ESGIC) -40 MG tablet  cyclobenzaprine (FLEXERIL) 10 MG tablet  DULoxetine HCl 40 MG CPEP  fexofenadine (ALLEGRA) 180 MG tablet  HYDROcodone-acetaminophen (NORCO) 5-325 MG tablet  LORazepam (ATIVAN) 0.5 MG tablet  ondansetron (ZOFRAN) 4 MG tablet  promethazine (PHENERGAN) 12.5 MG tablet  EPINEPHrine (ANY BX GENERIC EQUIV) 0.3 MG/0.3ML injection 2-pack        Past Medical History:    Biliary dyskinesia    Past Surgical History:    Past Surgical History:   Procedure Laterality Date     HYSTERECTOMY       LAPAROSCOPIC NISSEN FUNDOPLICATION  2000     SPINE SURGERY  2018        Social History:   reports that she has quit smoking. She has never used smokeless tobacco. She reports that she does not currently use drugs. She reports that she does not drink alcohol.  PCP: Darron Mendez     Physical Exam     Patient Vitals for the past 24 hrs:   BP Temp Temp src Pulse Resp SpO2   03/31/23 0100 109/80 -- -- 77 -- 100 %   03/31/23 0000 110/71 -- -- 69 -- 98 %   03/30/23 2134 120/80 -- -- 77 -- 100 %   03/30/23 2032 106/70 98.6  F (37  C) Temporal 72 24 100 %        Physical Exam  Constitutional: Alert, attentive, moderate pain distress  HENT:    Nose: Nose normal.     Mouth/Throat: Oropharynx is clear, mucous membranes are moist   Eyes: EOM are normal.   CV: regular rate and rhythm; no murmurs, rubs or gallups  Chest: Effort normal and breath sounds normal.   GI:  There is mild periumbilical tenderness. No distension. Normal bowel sounds  MSK: Normal range of motion.   Neurological: Alert, attentive  Skin: Skin is warm and dry.      Emergency Department Course     Imaging:  CT Abdomen Pelvis w Contrast   Final Result   IMPRESSION:    1.  Interval cholecystectomy. Expected trace pneumoperitoneum.   2.  Possible mild small bowel ileus.        Report per radiology    Laboratory:  Labs Ordered and Resulted from Time of ED Arrival to Time of ED Departure   COMPREHENSIVE METABOLIC PANEL - Abnormal       Result Value    Sodium 136      Potassium 4.3      Chloride 98      Carbon Dioxide (CO2) 16 (*)     Anion Gap 22 (*)     Urea Nitrogen 8.0      Creatinine 0.67      Calcium 9.9      Glucose 139 (*)     Alkaline Phosphatase 86      AST 33      ALT 18      Protein Total 8.0      Albumin 5.0      Bilirubin Total 0.5      GFR Estimate >90     CBC WITH PLATELETS AND DIFFERENTIAL - Abnormal    WBC Count 10.1      RBC Count 4.40      Hemoglobin 13.7      Hematocrit 39.7      MCV 90      MCH 31.1      MCHC 34.5      RDW 12.2      Platelet Count 289      % Neutrophils 88      % Lymphocytes 9      % Monocytes 3      % Eosinophils 0      % Basophils 0      % Immature Granulocytes 0      NRBCs per 100 WBC 0      Absolute Neutrophils 8.9 (*)     Absolute Lymphocytes 0.9      Absolute Monocytes 0.3      Absolute Eosinophils 0.0      Absolute Basophils 0.0      Absolute Immature Granulocytes 0.0      Absolute NRBCs 0.0     LIPASE - Normal    Lipase 21          Procedures       Emergency Department Course & Assessments:      Interventions:  Medications   morphine (PF) injection 4 mg (4 mg Intravenous $Given 3/30/23 2135)   ondansetron (ZOFRAN) injection 4 mg (4 mg Intravenous $Given 3/30/23 2057)    0.9% sodium chloride BOLUS (0 mLs Intravenous Stopped 3/31/23 0107)   CT Scan Flush (58 mLs Intravenous $Given 3/30/23 2112)   iopamidol (ISOVUE-370) solution 500 mL (71 mLs Intravenous $Given 3/30/23 2112)   LORazepam (ATIVAN) injection 0.5 mg (0.5 mg Intravenous $Given 3/30/23 2148)   diphenhydrAMINE (BENADRYL) injection 25 mg (25 mg Intravenous $Given 3/30/23 2152)   metoclopramide (REGLAN) injection 10 mg (10 mg Intravenous $Given 3/30/23 2150)        Independent Interpretation (X-rays, CTs, rhythm strip):  Scant pneumoperitoneum on CT AP consistent with laparasopic surgery      Disposition:  The patient was discharged to home.     Impression & Plan      Medical Decision Making:  This is a 42-year-old female status postcholecystectomy today who presents for ration of abdominal pain vomiting, and whole body paresthesia.  Patient declined pain medications at home initially, which could have led to some degree of uncontrolled pain.  She is significant improved here.  CT shows no concerning findings.  I discussed the possible ileus noted on CT, but the patient here has resolved symptoms, is tolerating clears, suggesting against clinical syndrome of ileus.  Discussed observation admission versus discharge, the patient for the latter.  We this is very reasonable.  Plan prescription for pain medication at home, ongoing Zofran at home, and surgical follow-up as scheduled.  Return precautions for intractable vomiting, worse pain, or any other concerns.      Diagnosis:    ICD-10-CM    1. Postoperative vomiting  R11.10     Z98.890       2. Postoperative abdominal pain  R10.9     G89.18            Discharge Medications:  Discharge Medication List as of 3/31/2023  1:07 AM      START taking these medications    Details   HYDROcodone-acetaminophen (NORCO) 5-325 MG tablet Take 1-2 tablets by mouth every 6 hours as needed for severe pain (7-10), Disp-10 tablet, R-0, E-Prescribe                    3/31/2023   No att. providers  found            Gary Marmolejo MD  03/31/23 [id number]

## 2023-03-31 NOTE — PHARMACY-ADMISSION MEDICATION HISTORY
Admission medication history interview status for this patient is complete. See [facility] admission navigator for allergy information, prior to admission medications and immunization status.     Medication history interview done, indicate source(s): Patient  Medication history resources (including written lists, pill bottles, clinic record):EPIC    Changes made to PTA medication list:  Added: none  Changed: zofran prn, promethazine prn  Reported as Not Taking: Omeprazole, Famotidine  Removed: Omeprazole, Famotidine    Medication reconciliation/reorder completed by provider prior to medication history?  N      Prior to Admission medications    Medication Sig Last Dose Taking? Auth Provider Long Term End Date   acetaminophen (TYLENOL) 325 MG tablet Take 325-650 mg by mouth every 6 hours as needed for mild pain  Yes Reported, Patient     butalbital-acetaminophen-caffeine (ESGIC) -40 MG tablet Take 1 tablet by mouth 2 times daily as needed Maximum of 3 days per month  Yes Reported, Patient     cyclobenzaprine (FLEXERIL) 10 MG tablet Take 5-10 mg by mouth nightly as needed for muscle spasms or other (sleep)  Yes Reported, Patient     DULoxetine HCl 40 MG CPEP Take 40 mg by mouth daily 3/29/2023 at am Yes Reported, Patient Yes    fexofenadine (ALLEGRA) 180 MG tablet Take 1 tablet by mouth daily 3/29/2023 at hs Yes Reported, Patient     LORazepam (ATIVAN) 0.5 MG tablet Take 0.5 mg by mouth daily as needed for sleep  Yes Reported, Patient     ondansetron (ZOFRAN) 4 MG tablet Take 4-8 mg by mouth 3 times daily as needed for nausea  Yes Unknown, Entered By History     promethazine (PHENERGAN) 12.5 MG tablet Take 12.5 mg by mouth 2 times daily as needed for nausea  Yes Reported, Patient     EPINEPHrine (ANY BX GENERIC EQUIV) 0.3 MG/0.3ML injection 2-pack  per pt med   Reported, Patient

## 2023-04-18 ENCOUNTER — TELEPHONE (OUTPATIENT)
Dept: SURGERY | Facility: CLINIC | Age: 43
End: 2023-04-18
Payer: COMMERCIAL

## 2023-04-18 NOTE — TELEPHONE ENCOUNTER
Attempted to call patient for post op check.  No answer.  Message was left for patient to call back if they had any questions of concerns.     ADDENDUM:  Patient returned call, states that she has had ongoing nausea since her lap jason on 3/30. She was seen recently by [facility] who diagnosed her with post-cholecystectomy syndrome and prescribed Carafate and GERD medications. She was instructed by [facility] provider to touch base with surgery regarding her symptoms as well.    Instructed patient to continue medical treatment of her symptoms through [facility], no surgical intervention is recommended for her symptoms.    Andree Hess PA-C

## 2023-10-26 ENCOUNTER — HOSPITAL ENCOUNTER (EMERGENCY)
Facility: CLINIC | Age: 43
Discharge: HOME | End: 2023-10-27
Attending: EMERGENCY MEDICINE | Admitting: EMERGENCY MEDICINE
Payer: COMMERCIAL

## 2023-10-26 ENCOUNTER — APPOINTMENT (OUTPATIENT)
Dept: CT IMAGING | Facility: CLINIC | Age: 43
End: 2023-10-26
Attending: EMERGENCY MEDICINE
Payer: COMMERCIAL

## 2023-10-26 ENCOUNTER — APPOINTMENT (OUTPATIENT)
Dept: GENERAL RADIOLOGY | Facility: CLINIC | Age: 43
End: 2023-10-26
Attending: EMERGENCY MEDICINE
Payer: COMMERCIAL

## 2023-10-26 DIAGNOSIS — K52.9 COLITIS: ICD-10-CM

## 2023-10-26 DIAGNOSIS — R11.2 NAUSEA AND VOMITING, UNSPECIFIED VOMITING TYPE: ICD-10-CM

## 2023-10-26 DIAGNOSIS — D72.829 LEUKOCYTOSIS, UNSPECIFIED TYPE: ICD-10-CM

## 2023-10-26 DIAGNOSIS — R42 DIZZINESS: ICD-10-CM

## 2023-10-26 DIAGNOSIS — R10.84 GENERALIZED ABDOMINAL PAIN: ICD-10-CM

## 2023-10-26 LAB
ALBUMIN SERPL BCG-MCNC: 5 G/DL (ref 3.5–5.2)
ALP SERPL-CCNC: 73 U/L (ref 35–104)
ALT SERPL W P-5'-P-CCNC: 13 U/L (ref 0–50)
ANION GAP SERPL CALCULATED.3IONS-SCNC: 13 MMOL/L (ref 7–15)
AST SERPL W P-5'-P-CCNC: 23 U/L (ref 0–45)
BASOPHILS # BLD AUTO: 0.1 10E3/UL (ref 0–0.2)
BASOPHILS NFR BLD AUTO: 1 %
BILIRUB SERPL-MCNC: 0.2 MG/DL
BUN SERPL-MCNC: 15.3 MG/DL (ref 6–20)
CALCIUM SERPL-MCNC: 10.3 MG/DL (ref 8.6–10)
CHLORIDE SERPL-SCNC: 105 MMOL/L (ref 98–107)
CREAT SERPL-MCNC: 0.68 MG/DL (ref 0.51–0.95)
DEPRECATED HCO3 PLAS-SCNC: 25 MMOL/L (ref 22–29)
EGFRCR SERPLBLD CKD-EPI 2021: >90 ML/MIN/1.73M2
EOSINOPHIL # BLD AUTO: 1 10E3/UL (ref 0–0.7)
EOSINOPHIL NFR BLD AUTO: 7 %
ERYTHROCYTE [DISTWIDTH] IN BLOOD BY AUTOMATED COUNT: 12.6 % (ref 10–15)
GLUCOSE SERPL-MCNC: 156 MG/DL (ref 70–99)
HCG SER QL IA.RAPID: NEGATIVE
HCG SERPL QL: NEGATIVE
HCT VFR BLD AUTO: 41.1 % (ref 35–47)
HGB BLD-MCNC: 13.5 G/DL (ref 11.7–15.7)
HOLD SPECIMEN: NORMAL
HOLD SPECIMEN: NORMAL
IMM GRANULOCYTES # BLD: 0.1 10E3/UL
IMM GRANULOCYTES NFR BLD: 0 %
LIPASE SERPL-CCNC: 40 U/L (ref 13–60)
LYMPHOCYTES # BLD AUTO: 1.7 10E3/UL (ref 0.8–5.3)
LYMPHOCYTES NFR BLD AUTO: 12 %
MCH RBC QN AUTO: 31.9 PG (ref 26.5–33)
MCHC RBC AUTO-ENTMCNC: 32.8 G/DL (ref 31.5–36.5)
MCV RBC AUTO: 97 FL (ref 78–100)
MONOCYTES # BLD AUTO: 0.6 10E3/UL (ref 0–1.3)
MONOCYTES NFR BLD AUTO: 4 %
NEUTROPHILS # BLD AUTO: 10.6 10E3/UL (ref 1.6–8.3)
NEUTROPHILS NFR BLD AUTO: 76 %
NRBC # BLD AUTO: 0 10E3/UL
NRBC BLD AUTO-RTO: 0 /100
PLATELET # BLD AUTO: 303 10E3/UL (ref 150–450)
POTASSIUM SERPL-SCNC: 3.8 MMOL/L (ref 3.4–5.3)
PROT SERPL-MCNC: 7.8 G/DL (ref 6.4–8.3)
RBC # BLD AUTO: 4.23 10E6/UL (ref 3.8–5.2)
SODIUM SERPL-SCNC: 143 MMOL/L (ref 135–145)
TROPONIN T SERPL HS-MCNC: <6 NG/L
WBC # BLD AUTO: 14 10E3/UL (ref 4–11)

## 2023-10-26 PROCEDURE — 250N000009 HC RX 250: Performed by: EMERGENCY MEDICINE

## 2023-10-26 PROCEDURE — 36415 COLL VENOUS BLD VENIPUNCTURE: CPT | Performed by: EMERGENCY MEDICINE

## 2023-10-26 PROCEDURE — 80053 COMPREHEN METABOLIC PANEL: CPT | Performed by: EMERGENCY MEDICINE

## 2023-10-26 PROCEDURE — 250N000011 HC RX IP 250 OP 636: Mod: JZ | Performed by: EMERGENCY MEDICINE

## 2023-10-26 PROCEDURE — 83690 ASSAY OF LIPASE: CPT | Performed by: EMERGENCY MEDICINE

## 2023-10-26 PROCEDURE — 258N000003 HC RX IP 258 OP 636: Performed by: EMERGENCY MEDICINE

## 2023-10-26 PROCEDURE — 74177 CT ABD & PELVIS W/CONTRAST: CPT

## 2023-10-26 PROCEDURE — 84703 CHORIONIC GONADOTROPIN ASSAY: CPT | Performed by: EMERGENCY MEDICINE

## 2023-10-26 PROCEDURE — 84703 CHORIONIC GONADOTROPIN ASSAY: CPT

## 2023-10-26 PROCEDURE — 99291 CRITICAL CARE FIRST HOUR: CPT | Mod: 25

## 2023-10-26 PROCEDURE — 85025 COMPLETE CBC W/AUTO DIFF WBC: CPT | Performed by: EMERGENCY MEDICINE

## 2023-10-26 PROCEDURE — 96374 THER/PROPH/DIAG INJ IV PUSH: CPT | Mod: 59

## 2023-10-26 PROCEDURE — 250N000011 HC RX IP 250 OP 636: Performed by: EMERGENCY MEDICINE

## 2023-10-26 PROCEDURE — 93005 ELECTROCARDIOGRAM TRACING: CPT

## 2023-10-26 PROCEDURE — 96361 HYDRATE IV INFUSION ADD-ON: CPT

## 2023-10-26 PROCEDURE — 71045 X-RAY EXAM CHEST 1 VIEW: CPT

## 2023-10-26 PROCEDURE — 96375 TX/PRO/DX INJ NEW DRUG ADDON: CPT

## 2023-10-26 PROCEDURE — 84484 ASSAY OF TROPONIN QUANT: CPT | Performed by: EMERGENCY MEDICINE

## 2023-10-26 RX ORDER — PROMETHAZINE HYDROCHLORIDE 25 MG/1
25 SUPPOSITORY RECTAL EVERY 6 HOURS PRN
Qty: 12 SUPPOSITORY | Refills: 0 | Status: SHIPPED | OUTPATIENT
Start: 2023-10-26

## 2023-10-26 RX ORDER — KETOROLAC TROMETHAMINE 15 MG/ML
15 INJECTION, SOLUTION INTRAMUSCULAR; INTRAVENOUS ONCE
Status: COMPLETED | OUTPATIENT
Start: 2023-10-26 | End: 2023-10-26

## 2023-10-26 RX ORDER — DIPHENHYDRAMINE HYDROCHLORIDE 50 MG/ML
25 INJECTION INTRAMUSCULAR; INTRAVENOUS ONCE
Status: COMPLETED | OUTPATIENT
Start: 2023-10-26 | End: 2023-10-26

## 2023-10-26 RX ORDER — MORPHINE SULFATE 2 MG/ML
2 INJECTION, SOLUTION INTRAMUSCULAR; INTRAVENOUS ONCE
Status: DISCONTINUED | OUTPATIENT
Start: 2023-10-26 | End: 2023-10-26

## 2023-10-26 RX ORDER — OLANZAPINE 10 MG/1
2.5 INJECTION, POWDER, LYOPHILIZED, FOR SOLUTION INTRAMUSCULAR ONCE
Status: COMPLETED | OUTPATIENT
Start: 2023-10-26 | End: 2023-10-26

## 2023-10-26 RX ORDER — LORAZEPAM 2 MG/ML
0.5 INJECTION INTRAMUSCULAR ONCE
Status: COMPLETED | OUTPATIENT
Start: 2023-10-26 | End: 2023-10-26

## 2023-10-26 RX ORDER — ONDANSETRON 2 MG/ML
4 INJECTION INTRAMUSCULAR; INTRAVENOUS ONCE
Status: COMPLETED | OUTPATIENT
Start: 2023-10-26 | End: 2023-10-26

## 2023-10-26 RX ORDER — IOPAMIDOL 755 MG/ML
500 INJECTION, SOLUTION INTRAVASCULAR ONCE
Status: COMPLETED | OUTPATIENT
Start: 2023-10-26 | End: 2023-10-26

## 2023-10-26 RX ADMIN — PROMETHAZINE HYDROCHLORIDE 25 MG: 25 INJECTION INTRAMUSCULAR; INTRAVENOUS at 21:05

## 2023-10-26 RX ADMIN — SODIUM CHLORIDE 58 ML: 9 INJECTION, SOLUTION INTRAVENOUS at 20:57

## 2023-10-26 RX ADMIN — IOPAMIDOL 71 ML: 755 INJECTION, SOLUTION INTRAVENOUS at 20:57

## 2023-10-26 RX ADMIN — DIPHENHYDRAMINE HYDROCHLORIDE 25 MG: 50 INJECTION, SOLUTION INTRAMUSCULAR; INTRAVENOUS at 20:16

## 2023-10-26 RX ADMIN — ONDANSETRON 4 MG: 2 INJECTION INTRAMUSCULAR; INTRAVENOUS at 18:02

## 2023-10-26 RX ADMIN — FAMOTIDINE 20 MG: 10 INJECTION, SOLUTION INTRAVENOUS at 18:50

## 2023-10-26 RX ADMIN — KETOROLAC TROMETHAMINE 15 MG: 15 INJECTION, SOLUTION INTRAMUSCULAR; INTRAVENOUS at 20:19

## 2023-10-26 RX ADMIN — SODIUM CHLORIDE 1000 ML: 9 INJECTION, SOLUTION INTRAVENOUS at 18:02

## 2023-10-26 RX ADMIN — OLANZAPINE 2.5 MG: 10 INJECTION, POWDER, FOR SOLUTION INTRAMUSCULAR at 18:50

## 2023-10-26 RX ADMIN — LORAZEPAM 0.5 MG: 2 INJECTION INTRAMUSCULAR; INTRAVENOUS at 20:17

## 2023-10-26 ASSESSMENT — ACTIVITIES OF DAILY LIVING (ADL)
ADLS_ACUITY_SCORE: 33
ADLS_ACUITY_SCORE: 35
ADLS_ACUITY_SCORE: 35

## 2023-10-26 NOTE — ED PROVIDER NOTES
History     Chief Complaint:  Dizziness and Nausea & Vomiting       The history is provided by the patient.      Kayley Scruggs is a 43 year old female with a history of Nissen fundoplication and chronic GI issues (alternating diarrhea and constipation) for which she follows with [facility] who presents with her  for dizziness, nausea, and vomiting.  She had a colonoscopy to investigate the cause of her aforementioned GI issues 3 days ago.  The following day she awoke with room spinning dizziness and nausea and vomiting which resolved with home promethazine and rest.  Again when she woke yesterday she had vertigo and nausea which improved even faster than the previous day.  This morning she had only mild symptoms initially and then much more significant dizziness and severe nausea and vomiting which ultimately prompted her presentation.  She had no improvement with promethazine.  She currently endorses dizziness only with head movements or resting with her eyes closed.  She also complains of chest pain which is pressure and cramping.  She has had several bowel movements today that were not black or bloody.  She uses marijuana.    Independent Historian:    Spouse at bedside endorses the history as stated above.     Review of External Notes:  Patient was seen at this ED on 3/30/23 after her cholecystectomy for nausea, vomiting, and abdominal pain. She was treated with Benadryl, Ativan, and Reglan. I also reviewed virtual visit note with [facility] on 10/16/23 where she was seen for one year of constipation, bloating, gas, and belching.     Medications:    Duloxetine  Lorazepam  Promethazine  Omeprazole  Pepcid      Past Medical History:    Cervical disc disorder at C6-C7 level with radiculopathy   Vitamin D deficiency   Hyperlipidemia   Panic attack   Interstitial cystitis   Adjustment disorder with depressed mood   Depressive disorder     Past Surgical History:    Cholecystecomy   Hysterectomy    Laparoscopic nissen fundoplication   Spinal cord decompression   Esophagogastroduodenoscopy   Hernia repair      Physical Exam   Patient Vitals for the past 24 hrs:   BP Temp Temp src Pulse Resp SpO2   10/26/23 2120 (!) 147/87 -- -- 69 -- 100 %   10/26/23 2110 -- -- -- -- -- 100 %   10/26/23 1935 134/78 -- -- 56 -- 99 %   10/26/23 1915 111/64 -- -- 63 -- 100 %   10/26/23 1900 119/74 -- -- 59 -- 96 %   10/26/23 1845 127/66 -- -- 65 -- 100 %   10/26/23 1830 128/63 -- -- (!) 48 -- 100 %   10/26/23 1815 114/80 -- -- 65 -- 100 %   10/26/23 1722 (!) 114/104 97.4  F (36.3  C) Temporal 104 26 99 %        Physical Exam  General: Well-developed and well-nourished.  Uncomfortable appearing middle-aged  woman retching and belching nearly continuously. Cooperative.  Head:  Atraumatic.  Eyes:  Conjunctivae, lids, and sclerae are normal.  ENT:    Normal nose. Moist mucous membranes.  Neck:  Supple. Normal range of motion.  CV:  Regular rate and rhythm. Normal heart sounds with no murmurs, rubs, or gallops detected.  Resp:  No respiratory distress. Clear to auscultation bilaterally without decreased breath sounds, wheezing, rales, or rhonchi.  GI:  Soft. Non-distended.  Mild diffuse tenderness.    MS:  Normal ROM.  Skin:  Warm. Non-diaphoretic. No pallor.  Neuro:  Awake. A&Ox3. Normal strength.  Psych: Anxious mood and affect. Normal speech.  Vitals reviewed.     Emergency Department Course   EKG  Indication: Chest discomfort   Time: 1946  Rate 60 bpm. CT interval 146. QRS duration 80. QT/QTc 488/488.   Sinus rhythm with premature atrial complexes   Otherwise normal ECG    No acute ST changes.  No prior for comparison.    Imaging:  CT Abdomen Pelvis w Contrast   Final Result   IMPRESSION:    1.  Wall thickening ascending, transverse, and proximal descending colon. Although this in part could be related to incomplete distention, acute colitis remains a concern. No evidence for complications or bowel obstruction.       XR  Chest 1 View   Final Result   IMPRESSION: Negative chest.        Report per radiology    Laboratory:  Labs Ordered and Resulted from Time of ED Arrival to Time of ED Departure   COMPREHENSIVE METABOLIC PANEL - Abnormal       Result Value    Sodium 143      Potassium 3.8      Carbon Dioxide (CO2) 25      Anion Gap 13      Urea Nitrogen 15.3      Creatinine 0.68      GFR Estimate >90      Calcium 10.3 (*)     Chloride 105      Glucose 156 (*)     Alkaline Phosphatase 73      AST 23      ALT 13      Protein Total 7.8      Albumin 5.0      Bilirubin Total 0.2     CBC WITH PLATELETS AND DIFFERENTIAL - Abnormal    WBC Count 14.0 (*)     RBC Count 4.23      Hemoglobin 13.5      Hematocrit 41.1      MCV 97      MCH 31.9      MCHC 32.8      RDW 12.6      Platelet Count 303      % Neutrophils 76      % Lymphocytes 12      % Monocytes 4      % Eosinophils 7      % Basophils 1      % Immature Granulocytes 0      NRBCs per 100 WBC 0      Absolute Neutrophils 10.6 (*)     Absolute Lymphocytes 1.7      Absolute Monocytes 0.6      Absolute Eosinophils 1.0 (*)     Absolute Basophils 0.1      Absolute Immature Granulocytes 0.1      Absolute NRBCs 0.0     HCG QUALITATIVE PREGNANCY - Normal    hCG Serum Qualitative Negative     LIPASE - Normal    Lipase 40     TROPONIN T, HIGH SENSITIVITY - Normal    Troponin T, High Sensitivity <6     ISTAT HCG QUALITATIVE PREGNANCY POCT - Normal    HCG Qualitative POCT Negative       Emergency Department Course & Assessments:  Interventions:  Medications   sodium chloride 0.9% BOLUS 1,000 mL (1,000 mLs Intravenous Stopped 10/26/23 1938)   ondansetron (ZOFRAN) injection 4 mg (4 mg Intravenous $Given 10/26/23 1802)   OLANZapine (zyPREXA) IV injection 2.5 mg (2.5 mg Intravenous $Given 10/26/23 1850)   famotidine (PEPCID) injection 20 mg (20 mg Intravenous $Given 10/26/23 1850)   promethazine (PHENERGAN) 25 mg in sodium chloride 0.9 % 55 mL intermittent infusion (25 mg Intravenous $Given 10/26/23 2105)  "  ketorolac (TORADOL) injection 15 mg (15 mg Intravenous $Given 10/26/23 2019)   LORazepam (ATIVAN) injection 0.5 mg (0.5 mg Intravenous $Given 10/26/23 2017)   diphenhydrAMINE (BENADRYL) injection 25 mg (25 mg Intravenous $Given 10/26/23 2016)     Assessments:  1828 I obtained history and examined the patient as noted above.   2004 I rechecked on and updated the patient. She says that she is still in \"a lot of pain\" and that it now localizes it to her abdomen.   2134 I reevaluated the patient.  She appears much more comfortable and does remark she has improvement in her abdominal pain.  She feels fatigued.  She has had several formed bowel movements in the ER.  We will allow her to rest by her Phenergan infuses and then PO challenge.  She will be appropriate for discharge if she passes this.    Independent Interpretation (X-rays, CTs, rhythm strip):  Reviewed chest X-ray: no pneumonia, no pneumothorax, and no pneumomediastinum.    Consultations/Discussion of Management or Tests:  Not applicable    Social Determinants of Health affecting care:  Supportive .   She uses marijuana.  Established gastroenterologist.     Disposition:  Care of the patient was transferred to my colleague, Dr. Pham, pending completion of Phenergan and PO challenge.     Impression & Plan    Medical Decision Making:  Kayley is a 43 year old woman who had a colonoscopy 3 days ago.  The last few mornings she has awoken with dizziness, nausea, and vomiting, which has improved with promethazine.  However, today she had much more significant symptoms which prompted her visit.  Her dizziness is positional and consistent with peripheral vertigo.  There is no indication for brain imaging.  She initially complained of chest pain and then later abdominal pain.  On exam she has near constant retching or belching and appears uncomfortable.  She does have mild diffuse abdominal tenderness.    Fortunately EKG is reassuring without acute ST changes or " arrhythmia.  I doubt a cardiac etiology for her presentation.  CT of the abdomen and pelvis reveals wall thickening which could be related to underdistention or acute colitis.  She is having formed bowel movements in the emergency department so I actually favor underdistention.  Chest x-ray does not reveal acute pathology such as pneumomediastinum that would explain her chest pain.  Troponin is undetectable.  She does not have kidney injury, electrolyte derangement, hepatitis, pancreatitis, biliary obstruction, or anemia.  She does have mild leukocytosis to 14.0.  Some of this may be hemoconcentration or stress demargination from recent colonoscopy and vomiting.  There is no indication for antibiotics at this time.    Her primary complaints of vomiting and abdominal pain were somewhat difficult to control.  She was treated with IV fluids and Zofran without any improvement.  She was given Zyprexa and Pepcid again without improvement.  She was ultimately treated with Phenergan, Toradol, Ativan, and Benadryl.  On last examination she had overall a significantly improved but was still receiving her Phenergan.  At completion of this medication we will PO challenge her.  If she passes that, she will be appropriate for discharge.  This is pending at the completion of my shift and her case was endorsed to my partner, Dr. Pham.  She does understand plan as well as plan to call GI to arrange follow up.  I will send Phenergan suppositories to the pharmacy for her to try if this recurs but she will return if she has worsening symptoms or new concerns including, but not limited to, neurologic symptoms.  All questions answered.    Diagnosis:    ICD-10-CM    1. Nausea and vomiting, unspecified vomiting type  R11.2       2. Dizziness  R42       3. Generalized abdominal pain  R10.84       4. Colitis  K52.9       5. Leukocytosis, unspecified type  D72.829            Scribe Disclosure:  I, Jennyfer Farooq, am serving as a scribe at 6:09  PM on 10/26/2023 to document services personally performed by Akosua Williamson MD based on my observations and the provider's statements to me.    10/26/2023   Akosua Williamson MD Dixson, Kylie S, MD  10/30/23 7624

## 2023-10-26 NOTE — ED TRIAGE NOTES
PT arrives with  who reports that pt had a colonoscopy Monday since then has had vertigo like symptoms that are worse in the morning. PT reports that today has gotten worse, PT is dry heaving/ gagging in triage. PT reports generalized abdominal discomfort. Pt denies changes in bowel or bladder. VSS and ABC's intact.

## 2023-10-27 VITALS
RESPIRATION RATE: 26 BRPM | DIASTOLIC BLOOD PRESSURE: 85 MMHG | OXYGEN SATURATION: 100 % | TEMPERATURE: 97.4 F | HEART RATE: 59 BPM | SYSTOLIC BLOOD PRESSURE: 130 MMHG

## 2023-10-27 LAB
ATRIAL RATE - MUSE: 60 BPM
DIASTOLIC BLOOD PRESSURE - MUSE: NORMAL MMHG
INTERPRETATION ECG - MUSE: NORMAL
P AXIS - MUSE: -11 DEGREES
PR INTERVAL - MUSE: 146 MS
QRS DURATION - MUSE: 80 MS
QT - MUSE: 488 MS
QTC - MUSE: 488 MS
R AXIS - MUSE: 47 DEGREES
SYSTOLIC BLOOD PRESSURE - MUSE: NORMAL MMHG
T AXIS - MUSE: 30 DEGREES
VENTRICULAR RATE- MUSE: 60 BPM

## 2023-10-27 NOTE — ED PROVIDER NOTES
Emergency Department                         Assumed Care Note      Patient signed out to me by Dr Marvin.    Briefly, Kayley Scruggs is a 43 year old female is being evaluated for dizziness, nausea, and vomiting. Please refer to Dr. Marvin's HPI for details.    /85   Pulse 59   Temp 97.4  F (36.3  C) (Temporal)   Resp 26   SpO2 100%     Thus far, studies reveal:     Labs Ordered and Resulted from Time of ED Arrival to Time of ED Departure   COMPREHENSIVE METABOLIC PANEL - Abnormal       Result Value    Sodium 143      Potassium 3.8      Carbon Dioxide (CO2) 25      Anion Gap 13      Urea Nitrogen 15.3      Creatinine 0.68      GFR Estimate >90      Calcium 10.3 (*)     Chloride 105      Glucose 156 (*)     Alkaline Phosphatase 73      AST 23      ALT 13      Protein Total 7.8      Albumin 5.0      Bilirubin Total 0.2     CBC WITH PLATELETS AND DIFFERENTIAL - Abnormal    WBC Count 14.0 (*)     RBC Count 4.23      Hemoglobin 13.5      Hematocrit 41.1      MCV 97      MCH 31.9      MCHC 32.8      RDW 12.6      Platelet Count 303      % Neutrophils 76      % Lymphocytes 12      % Monocytes 4      % Eosinophils 7      % Basophils 1      % Immature Granulocytes 0      NRBCs per 100 WBC 0      Absolute Neutrophils 10.6 (*)     Absolute Lymphocytes 1.7      Absolute Monocytes 0.6      Absolute Eosinophils 1.0 (*)     Absolute Basophils 0.1      Absolute Immature Granulocytes 0.1      Absolute NRBCs 0.0     HCG QUALITATIVE PREGNANCY - Normal    hCG Serum Qualitative Negative     LIPASE - Normal    Lipase 40     TROPONIN T, HIGH SENSITIVITY - Normal    Troponin T, High Sensitivity <6     ISTAT HCG QUALITATIVE PREGNANCY POCT - Normal    HCG Qualitative POCT Negative         CT Abdomen Pelvis w Contrast   Final Result   IMPRESSION:    1.  Wall thickening ascending, transverse, and proximal descending colon. Although this in part could be related to incomplete distention, acute colitis  remains a concern. No evidence for complications or bowel obstruction.       XR Chest 1 View   Final Result   IMPRESSION: Negative chest.        Pending studies include: None    Plan from sign out is: If patient able to tolerate PO and back to baseline (I.e. recover from sedative effects of phenergan), patient may be discharged home.    Progress notes:  ED Course as of 10/27/23 0509   u Oct 26, 2023   2724 Patient passed road test and p.o. challenge. On reevaluation, patient denies any room spinning dizziness and has improvement in nausea.  Patient has ambulated without difficulty and without ataxia.  Unlikely posterior circulation CVA. No indication for advanced brain/circulation imaging especially given symptom resolution. Dizziness symptoms likely peripheral in nature especially given history of dizziness worse with head movement. I did perform a bedside neuro exam which was reassuring without evidence of focal deficits or ataxia. NIHSS score of 0.  Known history of vertigo for the patient and history of easy motion sickness.  Suspect patient's symptoms may be secondary to lingering anesthesia effects from her colonoscopy.  Regardless, advised for patient to follow-up with PCP regarding visit to the ER today.  Return if symptoms recur or worsen.  No complaints of chest pain or shortness of breath. Patient feels comfortable with discharge home at this time.  Discussed return precautions.  Answered all questions.       Clinical impression:  1. Nausea and vomiting, unspecified vomiting type    2. Dizziness    3. Generalized abdominal pain    4. Colitis    5. Leukocytosis, unspecified type      Disposition:  Discharged home    DARIEN SANCHEZ DO  10/27/2023       Darien Sanchez,   10/27/23 0576

## 2023-10-27 NOTE — DISCHARGE INSTRUCTIONS
It does appear that there is some thickening of your colon which may explain some of your pain and frequent bowel movements.  Please call gastroenterology to discuss your symptoms and ER visit.  For nausea and vomiting you can use your home oral Phenergan.  Have also sent Phenergan suppositories to the pharmacy.  You need to return immediately if you have worsening symptoms or new concerns including, but not limited to, worsening pain, speech or vision changes, one-sided weakness or numbness, or fever greater than 101  F.  Follow-up with primary care if you have persistent dizziness despite resolution of other symptoms.

## 2024-09-13 ENCOUNTER — ANCILLARY PROCEDURE (OUTPATIENT)
Dept: BONE DENSITY | Facility: CLINIC | Age: 44
End: 2024-09-13
Attending: FAMILY MEDICINE
Payer: COMMERCIAL

## 2024-09-13 DIAGNOSIS — Z86.59 HISTORY OF EATING DISORDER: ICD-10-CM

## 2024-09-13 DIAGNOSIS — Z13.820 SCREENING FOR OSTEOPOROSIS: ICD-10-CM

## 2024-09-13 PROCEDURE — 77080 DXA BONE DENSITY AXIAL: CPT | Mod: TC | Performed by: PHYSICIAN ASSISTANT

## 2024-09-24 ENCOUNTER — HOSPITAL ENCOUNTER (OUTPATIENT)
Dept: ULTRASOUND IMAGING | Facility: CLINIC | Age: 44
Discharge: HOME | End: 2024-09-24
Payer: COMMERCIAL

## 2024-09-24 DIAGNOSIS — R14.0 BLOATING: ICD-10-CM

## 2024-09-24 PROCEDURE — 76856 US EXAM PELVIC COMPLETE: CPT

## 2024-09-24 PROCEDURE — 76830 TRANSVAGINAL US NON-OB: CPT

## 2024-10-19 ENCOUNTER — HEALTH MAINTENANCE LETTER (OUTPATIENT)
Age: 44
End: 2024-10-19

## (undated) DEVICE — ENDO POUCH UNIVERSAL RETRIEVAL SYSTEM INZII 5MM CD003

## (undated) DEVICE — DAVINCI XI DRAPE ARM 470015

## (undated) DEVICE — Device

## (undated) DEVICE — ENDO TROCAR FIRST ENTRY KII FIOS Z-THRD 05X100MM CTF03

## (undated) DEVICE — DAVINCI XI ESU FORCE BIPOLAR 8MM 471405

## (undated) DEVICE — SU VICRYL 0 UR-6 27" J603H

## (undated) DEVICE — GLOVE BIOGEL PI MICRO SZ 6.5 48565

## (undated) DEVICE — BLADE KNIFE SURG 11 371111

## (undated) DEVICE — LUBRICANT INST ELECTROLUBE EL101

## (undated) DEVICE — DAVINCI XI DRAPE COLUMN 470341

## (undated) DEVICE — SOL WATER IRRIG 1000ML BOTTLE 2F7114

## (undated) DEVICE — PREP CHLORAPREP 26ML TINTED ORANGE  260815

## (undated) DEVICE — SPECIMEN CONTAINER 4OZ

## (undated) DEVICE — SPONGE RAY-TEC 3X3" 30-094

## (undated) DEVICE — ESU ELEC BLADE 2.75" COATED/INSULATED E1455

## (undated) DEVICE — ESU GROUND PAD UNIVERSAL W/O CORD

## (undated) DEVICE — DAVINCI XI OBTURATOR BLADELESS 8MM 470359

## (undated) DEVICE — ESU PENCIL W/HOLSTER E2350H

## (undated) DEVICE — GLOVE BIOGEL PI MICRO INDICATOR UNDERGLOVE SZ 7.0 48970

## (undated) DEVICE — SU VICRYL 4-0 PS-2 18" UND J496H

## (undated) DEVICE — CLIP ENDO HEMO-LOC GREEN MED/LG 544230

## (undated) DEVICE — DAVINCI XI SEAL UNIVERSAL 5-8MM 470361

## (undated) RX ORDER — LIDOCAINE HYDROCHLORIDE 10 MG/ML
INJECTION, SOLUTION EPIDURAL; INFILTRATION; INTRACAUDAL; PERINEURAL
Status: DISPENSED
Start: 2023-03-30

## (undated) RX ORDER — FENTANYL CITRATE 50 UG/ML
INJECTION, SOLUTION INTRAMUSCULAR; INTRAVENOUS
Status: DISPENSED
Start: 2023-03-30

## (undated) RX ORDER — ACETAMINOPHEN 325 MG/1
TABLET ORAL
Status: DISPENSED
Start: 2023-03-30

## (undated) RX ORDER — BUPIVACAINE HYDROCHLORIDE AND EPINEPHRINE 5; 5 MG/ML; UG/ML
INJECTION, SOLUTION EPIDURAL; INTRACAUDAL; PERINEURAL
Status: DISPENSED
Start: 2023-03-30

## (undated) RX ORDER — INDOCYANINE GREEN AND WATER 25 MG
KIT INJECTION
Status: DISPENSED
Start: 2023-03-30

## (undated) RX ORDER — GLYCOPYRROLATE 0.2 MG/ML
INJECTION, SOLUTION INTRAMUSCULAR; INTRAVENOUS
Status: DISPENSED
Start: 2023-03-30

## (undated) RX ORDER — DEXAMETHASONE SODIUM PHOSPHATE 4 MG/ML
INJECTION, SOLUTION INTRA-ARTICULAR; INTRALESIONAL; INTRAMUSCULAR; INTRAVENOUS; SOFT TISSUE
Status: DISPENSED
Start: 2023-03-30

## (undated) RX ORDER — PROPOFOL 10 MG/ML
INJECTION, EMULSION INTRAVENOUS
Status: DISPENSED
Start: 2023-03-30

## (undated) RX ORDER — KETOROLAC TROMETHAMINE 30 MG/ML
INJECTION, SOLUTION INTRAMUSCULAR; INTRAVENOUS
Status: DISPENSED
Start: 2023-03-30

## (undated) RX ORDER — CEFAZOLIN SODIUM/WATER 2 G/20 ML
SYRINGE (ML) INTRAVENOUS
Status: DISPENSED
Start: 2023-03-30

## (undated) RX ORDER — ONDANSETRON 2 MG/ML
INJECTION INTRAMUSCULAR; INTRAVENOUS
Status: DISPENSED
Start: 2023-03-30